# Patient Record
Sex: FEMALE | Race: WHITE | NOT HISPANIC OR LATINO
[De-identification: names, ages, dates, MRNs, and addresses within clinical notes are randomized per-mention and may not be internally consistent; named-entity substitution may affect disease eponyms.]

---

## 2019-07-30 PROBLEM — Z00.00 ENCOUNTER FOR PREVENTIVE HEALTH EXAMINATION: Status: ACTIVE | Noted: 2019-07-30

## 2019-08-01 ENCOUNTER — APPOINTMENT (OUTPATIENT)
Dept: ORTHOPEDIC SURGERY | Facility: CLINIC | Age: 64
End: 2019-08-01
Payer: MEDICARE

## 2019-08-01 DIAGNOSIS — M19.011 PRIMARY OSTEOARTHRITIS, RIGHT SHOULDER: ICD-10-CM

## 2019-08-01 PROCEDURE — 73030 X-RAY EXAM OF SHOULDER: CPT | Mod: RT

## 2019-08-01 PROCEDURE — 99203 OFFICE O/P NEW LOW 30 MIN: CPT

## 2019-08-01 NOTE — ADDENDUM
[FreeTextEntry1] : I, Vianey Vizcaino, acted solely as a scribe for Dr. Dionicio Beavers on this date 08/01/2019.

## 2019-08-01 NOTE — DISCUSSION/SUMMARY
[de-identified] : The underlying pathophysiology was reviewed in great detail with the patient as well as the various treatment options, including ice, analgesics, NSAIDs, Physical therapy, steroid injections and surgery.\par \par She states that her shoulder pain is no longer bearable and would like to proceed with surgery.  I advised a reverse total shoulder replacement due to the high grade tearing of her rotator cuff.\par \par The patient wishes to proceed with SURGICAL INTERVENTION at this time. The risks and benefits of a RIGHT REVERSE TOTAL SHOULDER ARTHROPLASTY were discussed in great detail today, including but not limited to bleeding, infection, nerve injury, DVT, allergy to the anesthetic or to the implants, persistent pain, stiffness, scarring, swelling or deformity.

## 2019-08-01 NOTE — PHYSICAL EXAM
[Normal RUE] : Right Upper Extremity: No scars, rashes, lesions, ulcers, skin intact [Normal LUE] : Left Upper Extremity: No scars, rashes, lesions, ulcers, skin intact [Normal Touch] : sensation intact for touch [Normal] : No swelling, no edema, normal pedal pulses and normal temperature [Poor Appearance] : well-appearing [Acute Distress] : not in acute distress [Obese] : not obese [de-identified] : Right Upper Extremity\par o Shoulder : \par ¦ Inspection/Palpation : no tenderness, no swelling, no deformities\par ¦ Range of Motion : ACTIVE FORWARD ELEVATION: Measured at 120 degrees, ACTIVE EXTERNAL ROTATION: Measured at 30 degrees, ACTIVE INTERNAL ROTATION: Measured at PSIS\par ¦ Strength : external rotation 5/5, internal rotation 5/5, supraspinatus 5/5 \par ¦ Stability : no joint instability on provocative testing\par o Upper Arm : no tenderness, no swelling, no deformities\par o Muscle Bulk : no atrophy\par o Sensation : sensation intact to light touch\par o Skin : no skin rash or discoloration\par o Vascular Exam : no edema, no cyanosis, radial and ulnar pulses normal \par \par Left Upper Extremity\par o Shoulder :\par ¦ Inspection/Palpation : no tenderness, no swelling, no deformity \par ¦ Range of Motion : full and painless range in all planes, no crepitus \par ¦ Strength : external rotation 5/5, internal rotation 5/5, supraspinatus 5/5 \par ¦ Stability : no joint instability on provocative testing\par o Upper Arm : no tenderness, no swelling, no deformities\par o Muscle Bulk : no atrophy \par o Sensation : sensation intact to light touch \par o Skin : no skin rash, no discoloration \par o Vascular Exam : no edema, no cyanosis, radial and ulnar pulses normal  [de-identified] : o An MRI of the right shoulder was obtained at [facility] on 2/11/2019, revealed: (report only)\par 1. Advanced glenohumeral and mild acromioclavicular joint osteoarthritis.\par 2. High-grade, partial-thickness articular surface tearing of the distal supraspinatus and subscapularis tendons involving greater that 75% of the tendon fiber thickness with some articular surface fibers remaining intact. No full thickness rotator cuff tendon tear is seen.\par 3. Moderate infraspinatus tendinopathy. \par \par XRays obtained in the office today, 8/1/2019:\par o  Right Shoulder : Grashey, Axillary, and Outlet views were obtained, there are no soft tissue abnormalities, no fractures, severe glenohumeral osteoarthritis with bone on bone apposition, degenerative change of the greater tuberosity.

## 2019-08-01 NOTE — END OF VISIT
[FreeTextEntry3] : All medical record entries made by the Brayanibjami were at my, Dr. Dionicio Beavers, direction and personally dictated by me on 08/01/2019. I have reviewed the chart and agree that the record accurately reflects my personal performance of the history, physical exam, assessment and plan. I have also personally directed, reviewed, and agreed with the chart.

## 2019-08-01 NOTE — HISTORY OF PRESENT ILLNESS
[de-identified] : 64 year old female presents for an evaluation of right shoulder pain that has been persistent since 2005. An MRI of the right shoulder was obtained on 2/11/2019 which revealed advanced glenohumeral osteoarthritis and a high-grade partial rotator cuff tear; she presents with the report. The patient was treated with a corticosteroid injection of the right shoulder in May 2019, which temporarily alleviated her symptoms. Today she rates her pain an 8/10 and describes it as a aching throbbing pain that is nearly constant in nature.\par Her symptoms are exacerbated with all rotation of her shoulder and with extensive use of her right arm. Her pain has been waking her at night and she is unable to sleep on her right side. Today she would like to discuss the risks and benefits of a right total shoulder arthroplasty.

## 2019-10-28 ENCOUNTER — OUTPATIENT (OUTPATIENT)
Dept: OUTPATIENT SERVICES | Facility: HOSPITAL | Age: 64
LOS: 1 days | End: 2019-10-28
Payer: MEDICARE

## 2019-10-28 VITALS
SYSTOLIC BLOOD PRESSURE: 101 MMHG | HEIGHT: 66 IN | WEIGHT: 171.74 LBS | TEMPERATURE: 98 F | OXYGEN SATURATION: 98 % | DIASTOLIC BLOOD PRESSURE: 69 MMHG | RESPIRATION RATE: 16 BRPM | HEART RATE: 56 BPM

## 2019-10-28 DIAGNOSIS — Z98.891 HISTORY OF UTERINE SCAR FROM PREVIOUS SURGERY: Chronic | ICD-10-CM

## 2019-10-28 DIAGNOSIS — Z98.890 OTHER SPECIFIED POSTPROCEDURAL STATES: Chronic | ICD-10-CM

## 2019-10-28 DIAGNOSIS — Z98.1 ARTHRODESIS STATUS: Chronic | ICD-10-CM

## 2019-10-28 DIAGNOSIS — Z01.818 ENCOUNTER FOR OTHER PREPROCEDURAL EXAMINATION: ICD-10-CM

## 2019-10-28 DIAGNOSIS — M19.011 PRIMARY OSTEOARTHRITIS, RIGHT SHOULDER: ICD-10-CM

## 2019-10-28 DIAGNOSIS — M25.511 PAIN IN RIGHT SHOULDER: ICD-10-CM

## 2019-10-28 LAB
ALBUMIN SERPL ELPH-MCNC: 4 G/DL — SIGNIFICANT CHANGE UP (ref 3.3–5)
ALP SERPL-CCNC: 64 U/L — SIGNIFICANT CHANGE UP (ref 30–120)
ALT FLD-CCNC: 22 U/L DA — SIGNIFICANT CHANGE UP (ref 10–60)
ANION GAP SERPL CALC-SCNC: 6 MMOL/L — SIGNIFICANT CHANGE UP (ref 5–17)
APTT BLD: 29.3 SEC — SIGNIFICANT CHANGE UP (ref 28.5–37)
AST SERPL-CCNC: 13 U/L — SIGNIFICANT CHANGE UP (ref 10–40)
BILIRUB SERPL-MCNC: 0.3 MG/DL — SIGNIFICANT CHANGE UP (ref 0.2–1.2)
BLD GP AB SCN SERPL QL: SIGNIFICANT CHANGE UP
BUN SERPL-MCNC: 11 MG/DL — SIGNIFICANT CHANGE UP (ref 7–23)
CALCIUM SERPL-MCNC: 9.4 MG/DL — SIGNIFICANT CHANGE UP (ref 8.4–10.5)
CHLORIDE SERPL-SCNC: 104 MMOL/L — SIGNIFICANT CHANGE UP (ref 96–108)
CO2 SERPL-SCNC: 29 MMOL/L — SIGNIFICANT CHANGE UP (ref 22–31)
CREAT SERPL-MCNC: 0.68 MG/DL — SIGNIFICANT CHANGE UP (ref 0.5–1.3)
GLUCOSE SERPL-MCNC: 120 MG/DL — HIGH (ref 70–99)
HCT VFR BLD CALC: 43.9 % — SIGNIFICANT CHANGE UP (ref 34.5–45)
HGB BLD-MCNC: 14.4 G/DL — SIGNIFICANT CHANGE UP (ref 11.5–15.5)
INR BLD: 0.99 RATIO — SIGNIFICANT CHANGE UP (ref 0.88–1.16)
MCHC RBC-ENTMCNC: 31.2 PG — SIGNIFICANT CHANGE UP (ref 27–34)
MCHC RBC-ENTMCNC: 32.8 GM/DL — SIGNIFICANT CHANGE UP (ref 32–36)
MCV RBC AUTO: 95.2 FL — SIGNIFICANT CHANGE UP (ref 80–100)
NRBC # BLD: 0 /100 WBCS — SIGNIFICANT CHANGE UP (ref 0–0)
PLATELET # BLD AUTO: 229 K/UL — SIGNIFICANT CHANGE UP (ref 150–400)
POTASSIUM SERPL-MCNC: 4.9 MMOL/L — SIGNIFICANT CHANGE UP (ref 3.5–5.3)
POTASSIUM SERPL-SCNC: 4.9 MMOL/L — SIGNIFICANT CHANGE UP (ref 3.5–5.3)
PROT SERPL-MCNC: 7.3 G/DL — SIGNIFICANT CHANGE UP (ref 6–8.3)
PROTHROM AB SERPL-ACNC: 10.8 SEC — SIGNIFICANT CHANGE UP (ref 10–12.9)
RBC # BLD: 4.61 M/UL — SIGNIFICANT CHANGE UP (ref 3.8–5.2)
RBC # FLD: 13.2 % — SIGNIFICANT CHANGE UP (ref 10.3–14.5)
SODIUM SERPL-SCNC: 139 MMOL/L — SIGNIFICANT CHANGE UP (ref 135–145)
WBC # BLD: 5.91 K/UL — SIGNIFICANT CHANGE UP (ref 3.8–10.5)
WBC # FLD AUTO: 5.91 K/UL — SIGNIFICANT CHANGE UP (ref 3.8–10.5)

## 2019-10-28 PROCEDURE — 93010 ELECTROCARDIOGRAM REPORT: CPT

## 2019-10-28 PROCEDURE — 93005 ELECTROCARDIOGRAM TRACING: CPT

## 2019-10-28 PROCEDURE — G0463: CPT

## 2019-10-28 RX ORDER — L.ACIDOPH/B.ANIMALIS/B.LONGUM 15B CELL
1 CAPSULE ORAL
Qty: 0 | Refills: 0 | DISCHARGE

## 2019-10-28 NOTE — H&P PST ADULT - HISTORY OF PRESENT ILLNESS
63yo right hand dominant female patient with approximately 5yr history of progressively worsening right shoulder pain. She has had one Cortisone injection with slight improvement. She does Yoga several times weekly. She rates the pain at 8/10. She is not taking anything for pain. She was told that shoulder replacement surgery is recommended and presents today for PSTs.

## 2019-10-28 NOTE — H&P PST ADULT - MUSCULOSKELETAL
detailed exam no joint swelling/no joint erythema/no joint warmth/no calf tenderness/right shoulder/decreased ROM due to pain/diminished strength details…

## 2019-10-28 NOTE — H&P PST ADULT - NSICDXPASTMEDICALHX_GEN_ALL_CORE_FT
PAST MEDICAL HISTORY:  Hyperlipidemia no medication    Hypothyroid Natural Iodine with L-Thyrosine    Osteoarthritis PAST MEDICAL HISTORY:  Hyperlipidemia no medication    Hypothyroid Natural Iodine with L-Thyrosine    NAFLD (nonalcoholic fatty liver disease) per patient - resolved since being on a Vegan Diet    Osteoarthritis     Vegan diet

## 2019-10-28 NOTE — H&P PST ADULT - NSICDXFAMILYHX_GEN_ALL_CORE_FT
FAMILY HISTORY:  Family history of lung disease, mother- - mesothelioma  Family history of pacemaker, father- alive- age 87  FH: sleep apnea, brother  FHx: psoriatic arthritis, brother- alive - age 57

## 2019-10-28 NOTE — H&P PST ADULT - NSICDXPROBLEM_GEN_ALL_CORE_FT
PROBLEM DIAGNOSES  Problem: Pain in right shoulder  Assessment and Plan: RIGHT Reverse Total Shoulder Replacement on 11/06/19.

## 2019-10-28 NOTE — H&P PST ADULT - ASSESSMENT
65yo female patient scheduled for surgery on 11/06/19. She will obtain Medical Clearance. She will be NPO as per Anesthesia and will hold supplements for one week pre-op. All pre-op instructions reviewed with patient. She denies any metal allergies. 65yo female patient scheduled for surgery on 11/06/19. She will obtain Medical Clearance. She will be NPO as per Anesthesia and will hold tumeric (which she adds to daily shakes)  for one week pre-op. All pre-op instructions reviewed with patient. She denies any metal allergies.

## 2019-10-28 NOTE — H&P PST ADULT - PRIMARY CARE PROVIDER
Dr. Angel Pham- [phone] (sees practice NP- Sanjuanita Park) Dr. Angel Pham- [phone] (sees practice NP- Meena Park)

## 2019-10-29 LAB
MRSA PCR RESULT.: SIGNIFICANT CHANGE UP
S AUREUS DNA NOSE QL NAA+PROBE: DETECTED

## 2019-10-29 RX ORDER — MUPIROCIN 20 MG/G
1 OINTMENT TOPICAL
Qty: 1 | Refills: 0
Start: 2019-10-29 | End: 2019-11-02

## 2019-10-29 NOTE — PROGRESS NOTE ADULT - SUBJECTIVE AND OBJECTIVE BOX
Nose culture positive for MSSA. mupirocin ointment 2% escribed and sent to patient's pharmacy and to be used twice a day for 5 consecutive days. Called patient to review treatment protocol and questions answered. She verbalized an understanding.

## 2019-11-05 ENCOUNTER — TRANSCRIPTION ENCOUNTER (OUTPATIENT)
Age: 64
End: 2019-11-05

## 2019-11-05 RX ORDER — APREPITANT 80 MG/1
40 CAPSULE ORAL ONCE
Refills: 0 | Status: DISCONTINUED | OUTPATIENT
Start: 2019-11-06 | End: 2019-11-07

## 2019-11-06 ENCOUNTER — APPOINTMENT (OUTPATIENT)
Dept: ORTHOPEDIC SURGERY | Facility: HOSPITAL | Age: 64
End: 2019-11-06

## 2019-11-06 ENCOUNTER — INPATIENT (INPATIENT)
Facility: HOSPITAL | Age: 64
LOS: 0 days | Discharge: HOME HEALTH | DRG: 483 | End: 2019-11-07
Attending: ORTHOPAEDIC SURGERY | Admitting: ORTHOPAEDIC SURGERY
Payer: MEDICARE

## 2019-11-06 ENCOUNTER — RESULT REVIEW (OUTPATIENT)
Age: 64
End: 2019-11-06

## 2019-11-06 VITALS
OXYGEN SATURATION: 98 % | RESPIRATION RATE: 12 BRPM | SYSTOLIC BLOOD PRESSURE: 131 MMHG | HEIGHT: 66 IN | DIASTOLIC BLOOD PRESSURE: 75 MMHG | HEART RATE: 55 BPM | WEIGHT: 164.91 LBS | TEMPERATURE: 98 F

## 2019-11-06 DIAGNOSIS — Z98.890 OTHER SPECIFIED POSTPROCEDURAL STATES: Chronic | ICD-10-CM

## 2019-11-06 DIAGNOSIS — Z98.891 HISTORY OF UTERINE SCAR FROM PREVIOUS SURGERY: Chronic | ICD-10-CM

## 2019-11-06 DIAGNOSIS — Z98.1 ARTHRODESIS STATUS: Chronic | ICD-10-CM

## 2019-11-06 DIAGNOSIS — M19.011 PRIMARY OSTEOARTHRITIS, RIGHT SHOULDER: ICD-10-CM

## 2019-11-06 LAB
ABO RH CONFIRMATION: SIGNIFICANT CHANGE UP
ANION GAP SERPL CALC-SCNC: 12 MMOL/L — SIGNIFICANT CHANGE UP (ref 5–17)
BUN SERPL-MCNC: 8 MG/DL — SIGNIFICANT CHANGE UP (ref 7–23)
CALCIUM SERPL-MCNC: 8.7 MG/DL — SIGNIFICANT CHANGE UP (ref 8.4–10.5)
CHLORIDE SERPL-SCNC: 104 MMOL/L — SIGNIFICANT CHANGE UP (ref 96–108)
CO2 SERPL-SCNC: 23 MMOL/L — SIGNIFICANT CHANGE UP (ref 22–31)
CREAT SERPL-MCNC: 0.86 MG/DL — SIGNIFICANT CHANGE UP (ref 0.5–1.3)
GLUCOSE SERPL-MCNC: 204 MG/DL — HIGH (ref 70–99)
HCT VFR BLD CALC: 40.2 % — SIGNIFICANT CHANGE UP (ref 34.5–45)
HGB BLD-MCNC: 13.5 G/DL — SIGNIFICANT CHANGE UP (ref 11.5–15.5)
POTASSIUM SERPL-MCNC: 4.4 MMOL/L — SIGNIFICANT CHANGE UP (ref 3.5–5.3)
POTASSIUM SERPL-SCNC: 4.4 MMOL/L — SIGNIFICANT CHANGE UP (ref 3.5–5.3)
SODIUM SERPL-SCNC: 139 MMOL/L — SIGNIFICANT CHANGE UP (ref 135–145)

## 2019-11-06 PROCEDURE — 73030 X-RAY EXAM OF SHOULDER: CPT | Mod: 26,RT

## 2019-11-06 PROCEDURE — 99223 1ST HOSP IP/OBS HIGH 75: CPT | Mod: GC

## 2019-11-06 PROCEDURE — 23472 RECONSTRUCT SHOULDER JOINT: CPT | Mod: RT

## 2019-11-06 PROCEDURE — 23472 RECONSTRUCT SHOULDER JOINT: CPT | Mod: AS,RT

## 2019-11-06 PROCEDURE — 88304 TISSUE EXAM BY PATHOLOGIST: CPT | Mod: 26

## 2019-11-06 PROCEDURE — 88311 DECALCIFY TISSUE: CPT | Mod: 26

## 2019-11-06 RX ORDER — CEFAZOLIN SODIUM 1 G
2000 VIAL (EA) INJECTION ONCE
Refills: 0 | Status: COMPLETED | OUTPATIENT
Start: 2019-11-06 | End: 2019-11-06

## 2019-11-06 RX ORDER — ACETAMINOPHEN 500 MG
1000 TABLET ORAL ONCE
Refills: 0 | Status: COMPLETED | OUTPATIENT
Start: 2019-11-06 | End: 2019-11-06

## 2019-11-06 RX ORDER — CEFAZOLIN SODIUM 1 G
2000 VIAL (EA) INJECTION EVERY 8 HOURS
Refills: 0 | Status: COMPLETED | OUTPATIENT
Start: 2019-11-06 | End: 2019-11-06

## 2019-11-06 RX ORDER — HYDROMORPHONE HYDROCHLORIDE 2 MG/ML
0.5 INJECTION INTRAMUSCULAR; INTRAVENOUS; SUBCUTANEOUS
Refills: 0 | Status: DISCONTINUED | OUTPATIENT
Start: 2019-11-06 | End: 2019-11-07

## 2019-11-06 RX ORDER — MAGNESIUM HYDROXIDE 400 MG/1
30 TABLET, CHEWABLE ORAL DAILY
Refills: 0 | Status: DISCONTINUED | OUTPATIENT
Start: 2019-11-06 | End: 2019-11-07

## 2019-11-06 RX ORDER — OXYCODONE HYDROCHLORIDE 5 MG/1
10 TABLET ORAL
Refills: 0 | Status: DISCONTINUED | OUTPATIENT
Start: 2019-11-06 | End: 2019-11-07

## 2019-11-06 RX ORDER — ACETAMINOPHEN 500 MG
1000 TABLET ORAL EVERY 8 HOURS
Refills: 0 | Status: DISCONTINUED | OUTPATIENT
Start: 2019-11-07 | End: 2019-11-07

## 2019-11-06 RX ORDER — ACETAMINOPHEN 500 MG
1000 TABLET ORAL EVERY 6 HOURS
Refills: 0 | Status: COMPLETED | OUTPATIENT
Start: 2019-11-06 | End: 2019-11-07

## 2019-11-06 RX ORDER — SODIUM CHLORIDE 9 MG/ML
1000 INJECTION, SOLUTION INTRAVENOUS
Refills: 0 | Status: DISCONTINUED | OUTPATIENT
Start: 2019-11-06 | End: 2019-11-06

## 2019-11-06 RX ORDER — OXYCODONE HYDROCHLORIDE 5 MG/1
5 TABLET ORAL
Refills: 0 | Status: DISCONTINUED | OUTPATIENT
Start: 2019-11-06 | End: 2019-11-07

## 2019-11-06 RX ORDER — CHLORHEXIDINE GLUCONATE 213 G/1000ML
1 SOLUTION TOPICAL DAILY
Refills: 0 | Status: DISCONTINUED | OUTPATIENT
Start: 2019-11-06 | End: 2019-11-06

## 2019-11-06 RX ORDER — ONDANSETRON 8 MG/1
4 TABLET, FILM COATED ORAL EVERY 6 HOURS
Refills: 0 | Status: DISCONTINUED | OUTPATIENT
Start: 2019-11-06 | End: 2019-11-07

## 2019-11-06 RX ORDER — TRANEXAMIC ACID 100 MG/ML
1000 INJECTION, SOLUTION INTRAVENOUS ONCE
Refills: 0 | Status: COMPLETED | OUTPATIENT
Start: 2019-11-06 | End: 2019-11-06

## 2019-11-06 RX ORDER — HYDROMORPHONE HYDROCHLORIDE 2 MG/ML
0.5 INJECTION INTRAMUSCULAR; INTRAVENOUS; SUBCUTANEOUS
Refills: 0 | Status: DISCONTINUED | OUTPATIENT
Start: 2019-11-06 | End: 2019-11-06

## 2019-11-06 RX ORDER — SODIUM CHLORIDE 9 MG/ML
1000 INJECTION, SOLUTION INTRAVENOUS
Refills: 0 | Status: DISCONTINUED | OUTPATIENT
Start: 2019-11-06 | End: 2019-11-07

## 2019-11-06 RX ORDER — CELECOXIB 200 MG/1
200 CAPSULE ORAL EVERY 12 HOURS
Refills: 0 | Status: DISCONTINUED | OUTPATIENT
Start: 2019-11-06 | End: 2019-11-07

## 2019-11-06 RX ORDER — ONDANSETRON 8 MG/1
4 TABLET, FILM COATED ORAL ONCE
Refills: 0 | Status: DISCONTINUED | OUTPATIENT
Start: 2019-11-06 | End: 2019-11-06

## 2019-11-06 RX ORDER — ASPIRIN/CALCIUM CARB/MAGNESIUM 324 MG
81 TABLET ORAL
Refills: 0 | Status: DISCONTINUED | OUTPATIENT
Start: 2019-11-07 | End: 2019-11-07

## 2019-11-06 RX ORDER — ASCORBIC ACID 60 MG
1 TABLET,CHEWABLE ORAL
Qty: 0 | Refills: 0 | DISCHARGE

## 2019-11-06 RX ORDER — PANTOPRAZOLE SODIUM 20 MG/1
40 TABLET, DELAYED RELEASE ORAL
Refills: 0 | Status: DISCONTINUED | OUTPATIENT
Start: 2019-11-06 | End: 2019-11-07

## 2019-11-06 RX ADMIN — CHLORHEXIDINE GLUCONATE 1 APPLICATION(S): 213 SOLUTION TOPICAL at 07:33

## 2019-11-06 RX ADMIN — SODIUM CHLORIDE 100 MILLILITER(S): 9 INJECTION, SOLUTION INTRAVENOUS at 15:34

## 2019-11-06 RX ADMIN — Medication 1000 MILLIGRAM(S): at 21:21

## 2019-11-06 RX ADMIN — Medication 1000 MILLIGRAM(S): at 15:57

## 2019-11-06 RX ADMIN — SODIUM CHLORIDE 100 MILLILITER(S): 9 INJECTION, SOLUTION INTRAVENOUS at 11:45

## 2019-11-06 RX ADMIN — Medication 400 MILLIGRAM(S): at 21:20

## 2019-11-06 RX ADMIN — Medication 100 MILLIGRAM(S): at 15:57

## 2019-11-06 RX ADMIN — Medication 400 MILLIGRAM(S): at 15:30

## 2019-11-06 RX ADMIN — Medication 100 MILLIGRAM(S): at 23:30

## 2019-11-06 NOTE — CONSULT NOTE ADULT - ASSESSMENT
63 yo f pmh osteoarthritis, NAFLD, hypothyroid, HLD is s/p Right shoulder replacement POD #0.  1. s/p Right shoulder replacement POD #0  - post op orders and managemet per ortho team  - Bowl regimen  - Pain management  - Physical therapy eval  - Obtain baseline labs  - Dispo planning    2. Hypothyroid - takes special supplement.     3. HLD - diet controlled    4. NAFLD - chronic, diet controlled    5. DVT prophy - per ortho team. 65 yo f pmh osteoarthritis, NAFLD, hypothyroid, HLD is s/p Right shoulder replacement POD #0.  1. s/p Right shoulder replacement POD #0  - post op orders and managemet per ortho team  - Bowl regimen  - Pain management  - Physical therapy eval  - Obtain baseline labs  - Dispo planning    2. Hypothyroid - takes special supplement iodine at home.  Nothing here.      3. HLD - diet controlled    4. NAFLD - chronic, diet controlled    5. DVT prophy - per ortho team.

## 2019-11-06 NOTE — CONSULT NOTE ADULT - SUBJECTIVE AND OBJECTIVE BOX
Patient is a 64y old  Female who presents with a chief complaint of     HPI:    63 yo f pmh osteoarthritis, NAFLD, hypothyroid, HLD is s/p Right shoulder replacement POD #0.  She has approximately 5yr history of progressively worsening right shoulder pain.   In the past, she has had one Cortisone injection with slight improvement.   She does Yoga several times weekly. She rated the pain at 8/10.   She did not taking anything for pain.   She was told that shoulder replacement surgery is recommended.    Currently pt is feeling.         REVIEW OF SYSTEMS:    CONSTITUTIONAL: No fever, weight loss, or fatigue  EYES: No eye pain, visual disturbances, or discharge  ENMT:  No difficulty hearing, tinnitus, vertigo; No sinus or throat pain  NECK: No pain or stiffness  RESPIRATORY: No cough, wheezing, chills or hemoptysis; No shortness of breath  CARDIOVASCULAR: No chest pain, palpitations, dizziness, or leg swelling  GASTROINTESTINAL: No abdominal or epigastric pain. No nausea, vomiting, or hematemesis; No diarrhea or constipation. No melena or hematochezia.  GENITOURINARY: No dysuria, frequency, hematuria, or incontinence  NEUROLOGICAL: No headaches, memory loss, loss of strength, numbness, or tremors  SKIN: No itching, burning, rashes, or lesions   LYMPH NODES: No enlarged glands  ENDOCRINE: No heat or cold intolerance; No hair loss  MUSCULOSKELETAL: No joint pain or swelling; No muscle, back, or extremity pain  PSYCHIATRIC: No depression, anxiety, mood swings, or difficulty sleeping      PAST MEDICAL & SURGICAL HISTORY:  Vegan diet  NAFLD (nonalcoholic fatty liver disease): per patient - resolved since being on a Vegan Diet  Hypothyroid: Natural Iodine with L-Thyrosine  Osteoarthritis  Hyperlipidemia: no medication  S/P bilateral breast reduction:   S/p bilateral blepharoplasty:   S/P  section:   S/P abdominoplasty:   S/P cervical spinal fusion:   S/P arthroscopy of right shoulder:       FAMILY HISTORY:  FH: sleep apnea: brother  FHx: psoriatic arthritis: brother- alive - age 57  Family history of lung disease: mother- - mesothelioma  Family history of pacemaker: father- alive- age 87      Social History    denies tobacco, etoh, or drugs    MEDICATIONS  (STANDING):  acetaminophen  IVPB .. 1000 milliGRAM(s) IV Intermittent every 6 hours  lactated ringers. 1000 milliLiter(s) (100 mL/Hr) IV Continuous <Continuous>    MEDICATIONS  (PRN):  HYDROmorphone  Injectable 0.5 milliGRAM(s) IV Push every 10 minutes PRN Moderate Pain (4 - 6)  ondansetron Injectable 4 milliGRAM(s) IV Push once PRN Nausea and/or Vomiting      Allergies    No Known Allergies    Intolerances        Vital Signs Last 24 Hrs  T(C): 36.4 (2019 11:08), Max: 36.5 (2019 06:35)  T(F): 97.6 (2019 11:08), Max: 97.7 (2019 06:35)  HR: 80 (2019 11:45) (55 - 99)  BP: 123/56 (2019 11:45) (117/65 - 146/76)  BP(mean): --  RR: 15 (2019 11:45) (12 - 18)  SpO2: 96% (2019 11:45) (96% - 98%)  PHYSICAL EXAM:      GENERAL: NAD, well-groomed, well-developed  HEAD:  Atraumatic, Normocephalic  EYES: EOMI, PERRLA, conjunctiva and sclera clear  ENMT: No tonsillar erythema, exudates, or enlargement; Moist mucous membranes, Good dentition, No lesions  NECK: Supple, No JVD, Normal thyroid  CHEST/LUNG: Clear to percussion bilaterally; No rales, rhonchi, wheezing, or rubs  HEART: Regular rate and rhythm; No murmurs, rubs, or gallops  ABDOMEN: Soft, Nontender, Nondistended; Bowel sounds present  EXTREMITIES:  2+ Peripheral Pulses, No clubbing, cyanosis, or edema  LYMPH: No lymphadenopathy noted  NERVOUS SYSTEM:  Alert & Oriented X3, Good concentration;   SKIN: No rashes or lesions                        CAPILLARY BLOOD GLUCOSE          EKG -     RADIOLOGY STUDIES Patient is a 64y old  Female who presents with a chief complaint of     HPI:    63 yo f pmh osteoarthritis, NAFLD, hypothyroid, HLD is s/p Right shoulder replacement POD #0.  She has approximately 5yr history of progressively worsening right shoulder pain.   In the past, she has had one Cortisone injection with slight improvement.   She does Yoga several times weekly. She rated the pain at 8/10.   She did not taking anything for pain.   She was told that shoulder replacement surgery is recommended.    Currently pt is feeling fine.       REVIEW OF SYSTEMS:    CONSTITUTIONAL: No fever, weight loss, or fatigue  EYES: No eye pain, visual disturbances, or discharge  ENMT:  No difficulty hearing, tinnitus, vertigo; No sinus or throat pain  NECK: No pain or stiffness  RESPIRATORY: No cough, wheezing, chills or hemoptysis; No shortness of breath  CARDIOVASCULAR: No chest pain, palpitations, dizziness, or leg swelling  GASTROINTESTINAL: No abdominal or epigastric pain. No nausea, vomiting, or hematemesis; No diarrhea or constipation. No melena or hematochezia.  GENITOURINARY: No dysuria, frequency, hematuria, or incontinence  NEUROLOGICAL: No headaches, memory loss, loss of strength, numbness, or tremors  SKIN: No itching, burning, rashes, or lesions   LYMPH NODES: No enlarged glands  ENDOCRINE: No heat or cold intolerance; No hair loss  MUSCULOSKELETAL: No joint pain or swelling; No muscle, back, or extremity pain  PSYCHIATRIC: No depression, anxiety, mood swings, or difficulty sleeping      PAST MEDICAL & SURGICAL HISTORY:  Vegan diet  NAFLD (nonalcoholic fatty liver disease): per patient - resolved since being on a Vegan Diet  Hypothyroid: Natural Iodine with L-Thyrosine  Osteoarthritis  Hyperlipidemia: no medication  S/P bilateral breast reduction:   S/p bilateral blepharoplasty:   S/P  section:   S/P abdominoplasty:   S/P cervical spinal fusion:   S/P arthroscopy of right shoulder:       FAMILY HISTORY:  FH: sleep apnea: brother  FHx: psoriatic arthritis: brother- alive - age 57  Family history of lung disease: mother- - mesothelioma  Family history of pacemaker: father- alive- age 87      Social History    denies tobacco, etoh, or drugs    MEDICATIONS  (STANDING):  acetaminophen  IVPB .. 1000 milliGRAM(s) IV Intermittent every 6 hours  lactated ringers. 1000 milliLiter(s) (100 mL/Hr) IV Continuous <Continuous>    MEDICATIONS  (PRN):  HYDROmorphone  Injectable 0.5 milliGRAM(s) IV Push every 10 minutes PRN Moderate Pain (4 - 6)  ondansetron Injectable 4 milliGRAM(s) IV Push once PRN Nausea and/or Vomiting      Allergies    No Known Allergies    Intolerances        Vital Signs Last 24 Hrs  T(C): 36.4 (2019 11:08), Max: 36.5 (2019 06:35)  T(F): 97.6 (2019 11:08), Max: 97.7 (2019 06:35)  HR: 80 (2019 11:45) (55 - 99)  BP: 123/56 (2019 11:45) (117/65 - 146/76)  BP(mean): --  RR: 15 (2019 11:45) (12 - 18)  SpO2: 96% (2019 11:45) (96% - 98%)  PHYSICAL EXAM:      GENERAL: NAD, well-groomed, well-developed  HEAD:  Atraumatic, Normocephalic  EYES: EOMI, PERRLA, conjunctiva and sclera clear  ENMT: No tonsillar erythema, exudates, or enlargement; Moist mucous membranes, Good dentition, No lesions  NECK: Supple, No JVD, Normal thyroid  CHEST/LUNG: Clear to percussion bilaterally; No rales, rhonchi, wheezing, or rubs  HEART: Regular rate and rhythm; No murmurs, rubs, or gallops  ABDOMEN: Soft, Nontender, Nondistended; Bowel sounds present  EXTREMITIES:  Right shoulder brace.  Able to move fingers. decreased sensation in fingers on right hand.  2+ Peripheral Pulses, No clubbing, cyanosis, or edema  LYMPH: No lymphadenopathy noted  NERVOUS SYSTEM:  Alert & Oriented X3, Good concentration; decreased sensation in fingers on right hand  SKIN: No rashes or lesions                        CAPILLARY BLOOD GLUCOSE          EKG -     RADIOLOGY STUDIES

## 2019-11-06 NOTE — BRIEF OPERATIVE NOTE - NSICDXBRIEFPROCEDURE_GEN_ALL_CORE_FT
PROCEDURES:  Open replacement of right shoulder joint with reverse ball and socket synthetic substitute 06-Nov-2019 11:24:34  Smita Nava

## 2019-11-07 ENCOUNTER — TRANSCRIPTION ENCOUNTER (OUTPATIENT)
Age: 64
End: 2019-11-07

## 2019-11-07 VITALS
RESPIRATION RATE: 16 BRPM | TEMPERATURE: 97 F | DIASTOLIC BLOOD PRESSURE: 65 MMHG | OXYGEN SATURATION: 95 % | SYSTOLIC BLOOD PRESSURE: 139 MMHG | HEART RATE: 54 BPM

## 2019-11-07 LAB
ANION GAP SERPL CALC-SCNC: 10 MMOL/L — SIGNIFICANT CHANGE UP (ref 5–17)
BASOPHILS # BLD AUTO: 0.01 K/UL — SIGNIFICANT CHANGE UP (ref 0–0.2)
BASOPHILS NFR BLD AUTO: 0.1 % — SIGNIFICANT CHANGE UP (ref 0–2)
BUN SERPL-MCNC: 9 MG/DL — SIGNIFICANT CHANGE UP (ref 7–23)
CALCIUM SERPL-MCNC: 8.8 MG/DL — SIGNIFICANT CHANGE UP (ref 8.4–10.5)
CHLORIDE SERPL-SCNC: 107 MMOL/L — SIGNIFICANT CHANGE UP (ref 96–108)
CO2 SERPL-SCNC: 24 MMOL/L — SIGNIFICANT CHANGE UP (ref 22–31)
CREAT SERPL-MCNC: 0.69 MG/DL — SIGNIFICANT CHANGE UP (ref 0.5–1.3)
EOSINOPHIL # BLD AUTO: 0.02 K/UL — SIGNIFICANT CHANGE UP (ref 0–0.5)
EOSINOPHIL NFR BLD AUTO: 0.2 % — SIGNIFICANT CHANGE UP (ref 0–6)
GLUCOSE SERPL-MCNC: 130 MG/DL — HIGH (ref 70–99)
HCT VFR BLD CALC: 38.7 % — SIGNIFICANT CHANGE UP (ref 34.5–45)
HGB BLD-MCNC: 12.6 G/DL — SIGNIFICANT CHANGE UP (ref 11.5–15.5)
IMM GRANULOCYTES NFR BLD AUTO: 0.4 % — SIGNIFICANT CHANGE UP (ref 0–1.5)
LYMPHOCYTES # BLD AUTO: 1.52 K/UL — SIGNIFICANT CHANGE UP (ref 1–3.3)
LYMPHOCYTES # BLD AUTO: 13 % — SIGNIFICANT CHANGE UP (ref 13–44)
MCHC RBC-ENTMCNC: 31.3 PG — SIGNIFICANT CHANGE UP (ref 27–34)
MCHC RBC-ENTMCNC: 32.6 GM/DL — SIGNIFICANT CHANGE UP (ref 32–36)
MCV RBC AUTO: 96.3 FL — SIGNIFICANT CHANGE UP (ref 80–100)
MONOCYTES # BLD AUTO: 0.87 K/UL — SIGNIFICANT CHANGE UP (ref 0–0.9)
MONOCYTES NFR BLD AUTO: 7.5 % — SIGNIFICANT CHANGE UP (ref 2–14)
NEUTROPHILS # BLD AUTO: 9.18 K/UL — HIGH (ref 1.8–7.4)
NEUTROPHILS NFR BLD AUTO: 78.8 % — HIGH (ref 43–77)
NRBC # BLD: 0 /100 WBCS — SIGNIFICANT CHANGE UP (ref 0–0)
PLATELET # BLD AUTO: 222 K/UL — SIGNIFICANT CHANGE UP (ref 150–400)
POTASSIUM SERPL-MCNC: 4.2 MMOL/L — SIGNIFICANT CHANGE UP (ref 3.5–5.3)
POTASSIUM SERPL-SCNC: 4.2 MMOL/L — SIGNIFICANT CHANGE UP (ref 3.5–5.3)
RBC # BLD: 4.02 M/UL — SIGNIFICANT CHANGE UP (ref 3.8–5.2)
RBC # FLD: 13.8 % — SIGNIFICANT CHANGE UP (ref 10.3–14.5)
SODIUM SERPL-SCNC: 141 MMOL/L — SIGNIFICANT CHANGE UP (ref 135–145)
WBC # BLD: 11.65 K/UL — HIGH (ref 3.8–10.5)
WBC # FLD AUTO: 11.65 K/UL — HIGH (ref 3.8–10.5)

## 2019-11-07 PROCEDURE — 99233 SBSQ HOSP IP/OBS HIGH 50: CPT

## 2019-11-07 RX ORDER — OXYCODONE HYDROCHLORIDE 5 MG/1
1 TABLET ORAL
Qty: 42 | Refills: 0
Start: 2019-11-07 | End: 2019-11-13

## 2019-11-07 RX ORDER — CELECOXIB 200 MG/1
1 CAPSULE ORAL
Qty: 60 | Refills: 0
Start: 2019-11-07 | End: 2019-12-06

## 2019-11-07 RX ORDER — ACETAMINOPHEN 500 MG
2 TABLET ORAL
Qty: 0 | Refills: 0 | DISCHARGE
Start: 2019-11-07

## 2019-11-07 RX ORDER — ASPIRIN/CALCIUM CARB/MAGNESIUM 324 MG
1 TABLET ORAL
Qty: 84 | Refills: 0
Start: 2019-11-07 | End: 2019-12-18

## 2019-11-07 RX ORDER — PANTOPRAZOLE SODIUM 20 MG/1
1 TABLET, DELAYED RELEASE ORAL
Qty: 30 | Refills: 1
Start: 2019-11-07 | End: 2020-01-05

## 2019-11-07 RX ORDER — L.ACIDOPH/B.ANIMALIS/B.LONGUM 15B CELL
2 CAPSULE ORAL
Qty: 0 | Refills: 0 | DISCHARGE

## 2019-11-07 RX ADMIN — Medication 1000 MILLIGRAM(S): at 10:14

## 2019-11-07 RX ADMIN — CELECOXIB 200 MILLIGRAM(S): 200 CAPSULE ORAL at 05:20

## 2019-11-07 RX ADMIN — OXYCODONE HYDROCHLORIDE 5 MILLIGRAM(S): 5 TABLET ORAL at 09:06

## 2019-11-07 RX ADMIN — Medication 81 MILLIGRAM(S): at 05:20

## 2019-11-07 RX ADMIN — CELECOXIB 200 MILLIGRAM(S): 200 CAPSULE ORAL at 05:19

## 2019-11-07 RX ADMIN — Medication 400 MILLIGRAM(S): at 03:03

## 2019-11-07 RX ADMIN — OXYCODONE HYDROCHLORIDE 5 MILLIGRAM(S): 5 TABLET ORAL at 16:13

## 2019-11-07 RX ADMIN — OXYCODONE HYDROCHLORIDE 5 MILLIGRAM(S): 5 TABLET ORAL at 16:43

## 2019-11-07 RX ADMIN — Medication 1000 MILLIGRAM(S): at 03:04

## 2019-11-07 RX ADMIN — PANTOPRAZOLE SODIUM 40 MILLIGRAM(S): 20 TABLET, DELAYED RELEASE ORAL at 05:19

## 2019-11-07 RX ADMIN — OXYCODONE HYDROCHLORIDE 5 MILLIGRAM(S): 5 TABLET ORAL at 09:36

## 2019-11-07 NOTE — DISCHARGE NOTE PROVIDER - NSDCACTIVITY_GEN_ALL_CORE
Do not drive or operate machinery/Walking - Indoors allowed/No heavy lifting/straining/Walking - Outdoors allowed/Stairs allowed/Showering allowed

## 2019-11-07 NOTE — DISCHARGE NOTE PROVIDER - NSDCCPTREATMENT_GEN_ALL_CORE_FT
PRINCIPAL PROCEDURE  Procedure: Open replacement of right shoulder joint with reverse ball and socket synthetic substitute  Findings and Treatment:

## 2019-11-07 NOTE — DISCHARGE NOTE PROVIDER - NSDCMRMEDTOKEN_GEN_ALL_CORE_FT
acetaminophen 500 mg oral tablet: 2 tab(s) orally every 8 hours  aspirin 81 mg oral delayed release tablet: 1 tab(s) orally 2 times a day. Take at least 2 hours before celebrex  celecoxib 200 mg oral capsule: 1 cap(s) orally every 12 hours. Take at least 2 hours after aspirin  Multiple Vitamins with Minerals oral tablet: 1 tab(s) orally once a day  mupirocin 2% topical ointment: 1 application in each nostril 2 times a day   pantoprazole 40 mg oral delayed release tablet: 1 tab(s) orally once a day (before a meal)  Probiotic Formula oral capsule: 2 cap(s) orally once a day  Sleep supplement: 1 dose(s) orally once a day (at bedtime)  super colon cleanse: 1 dose(s) orally once a day  thyroid care: 2 cap(s) orally once a day  Vitamin C 500 mg oral tablet: 1 tab(s) orally once a day acetaminophen 500 mg oral tablet: 2 tab(s) orally every 8 hours  aspirin 81 mg oral delayed release tablet: 1 tab(s) orally 2 times a day. Take at least 2 hours before celebrex  celecoxib 200 mg oral capsule: 1 cap(s) orally every 12 hours. Take at least 2 hours after aspirin  Multiple Vitamins with Minerals oral tablet: 1 tab(s) orally once a day  mupirocin 2% topical ointment: 1 application in each nostril 2 times a day   oxyCODONE 5 mg oral tablet: 1 tab(s) orally every 4 hours, As Needed - Pain  MDD:6  Reference #: [id number]  pantoprazole 40 mg oral delayed release tablet: 1 tab(s) orally once a day (before a meal)  Probiotic Formula oral capsule: 2 cap(s) orally once a day  Sleep supplement: 1 dose(s) orally once a day (at bedtime)  super colon cleanse: 1 dose(s) orally once a day  thyroid care: 2 cap(s) orally once a day  Vitamin C 500 mg oral tablet: 1 tab(s) orally once a day acetaminophen 500 mg oral tablet: 2 tab(s) orally every 8 hours  aspirin 81 mg oral delayed release tablet: 1 tab(s) orally 2 times a day. Take at least 2 hours before celebrex  celecoxib 200 mg oral capsule: 1 cap(s) orally every 12 hours. Take at least 2 hours after aspirin  Multiple Vitamins with Minerals oral tablet: 1 tab(s) orally once a day  oxyCODONE 5 mg oral tablet: 1 tab(s) orally every 4 hours, As Needed - Pain  MDD:6  Reference #: [id number]  pantoprazole 40 mg oral delayed release tablet: 1 tab(s) orally once a day (before a meal)  Vitamin C 500 mg oral tablet: 1 tab(s) orally once a day acetaminophen 500 mg oral tablet: 2 tab(s) orally every 8 hours  aspirin 81 mg oral delayed release tablet: 1 tab(s) orally 2 times a day. Take at least 2 hours before celebrex  celecoxib 200 mg oral capsule: 1 cap(s) orally every 12 hours. Take at least 2 hours after aspirin  Multiple Vitamins with Minerals oral tablet: 1 tab(s) orally once a day  oxyCODONE 5 mg oral tablet: 1 tab(s) orally every 4 hours, As Needed - Pain  MDD:6  Reference #: [id number]  pantoprazole 40 mg oral delayed release tablet: 1 tab(s) orally once a day (before a meal)  Vitamin C 500 mg oral tablet: 1 tab(s) orally once a day

## 2019-11-07 NOTE — DISCHARGE NOTE NURSING/CASE MANAGEMENT/SOCIAL WORK - PATIENT PORTAL LINK FT
You can access the FollowMyHealth Patient Portal offered by [facility] by registering at the following website: [email or web address]. By joining [employer]’s FollowMyHealth portal, you will also be able to view your health information using other applications (apps) compatible with our system.

## 2019-11-07 NOTE — DISCHARGE NOTE PROVIDER - HOSPITAL COURSE
YESSICA WAKEFIELD        Admitted on 11-06-19         64y y.o.  Female with history of Arthritis of right shoulder region        Surgery:   Open replacement of right shoulder joint with reverse ball and socket synthetic substitute        Orthopedic Surgeon:   Dr. VALERIA Beavers        Mary-operative antibiotic:      ceFAZolin   IVPB:,             Consulted Services : Hospitalist, Physical Therapy, Occupational Therapy        Typical Physical & occupational therapy modalities post Open replacement of right shoulder joint with reverse ball and socket synthetic substitute     were performed including ambulation training, range of motion, ADL's, and transfers.         DVT prophylaxis:  aspirin enteric coated: 81 milliGRAM(s)             The patient had a clean appearing surgical incision with no sign of surgical site infections and had a stable neuro / vascular exam of the operated extremity.  After progression of mobility guided by the PT/ OT staff,  the patient was felt to benefit from further rehabilitative care for restoration to level of function. This was felt to best be accomplished at Home.        Discharge and Orthopedic Care instructions were delineated in the Discharge Plan and reviewed with the patient. All medications were delineated in the medication reconciliation tool and key points were reviewed with the patient.         This patient was deemed stable from an Orthopedic & medical standpoint for discharge today.    She will follow up with Dr. VALERIA Beavers for further Orthopedic care.

## 2019-11-07 NOTE — DISCHARGE NOTE PROVIDER - NSDCFUADDINST_GEN_ALL_CORE_FT
Do not bear weight on operative extremity.   Sling for comfort and ambulation only  Remove sling to exercise elbow and shoulder  Full AAROM of shoulder  No external rotation past 30 degrees  No resisted internal rotation  May do Codman's and pendulums  Keep the incision clean and dry  Change the dressing daily if there is drainage noted  When there is no drainage the incision may be left open to air  You may shower and pat dry with a clean towel when there is no drainage  Suture or Prineo removal 2 weeks after surgery at your surgeon's office Do not bear weight on operative extremity.   Sling for comfort and ambulation only  Remove sling to exercise elbow and shoulder  Full AAROM of shoulder  No external rotation past 30 degrees  No resisted internal rotation  May do Codman's and pendulums  Keep the incision clean and dry  Change the dressing daily if there is drainage noted  When there is no drainage the incision may be left open to air  You may shower and pat dry with a clean towel when there is no drainage  Suture or Prineo removal 2 weeks after surgery at your surgeon's office   Call your doctor if you experience:  • An increase in pain not controlled by pain medication or change in activity or  position.  • Temperature greater than 101° F.  • Redness, increased swelling or foul smelling drainage from or around the  incision.  • Numbness, tingling or a change in color or temperature of the operative leg.  • Call your doctor immediately if you experience chest pain, shortness of breath or calf pain.    For Constipation :   • Increase your water intake. Drink at least 8 glasses of water daily.  • Try adding fiber to your diet by eating fruits, vegetables and foods that are rich in grains.  • If you do experience constipation, you may take an over-the-counter stool softener/laxative such as Colace, Senokot or Milk of Magnesia.

## 2019-11-07 NOTE — PROGRESS NOTE ADULT - SUBJECTIVE AND OBJECTIVE BOX
Post Op Day # 1    SUBJECTIVE    65yo Female status post Right Rev TSR .   Patient is alert and comfortable.    Pain is controlled with current pain regimen.  Denies nausea, vomiting, chest pain, shortness of breath, abdominal pain or fever.   No new complaints.    OBJECTIVE    Vital Signs Last 24 Hrs  T(C): 36.8 (07 Nov 2019 07:58), Max: 36.8 (07 Nov 2019 07:58)  T(F): 98.3 (07 Nov 2019 07:58), Max: 98.3 (07 Nov 2019 07:58)  HR: 58 (07 Nov 2019 07:58) (58 - 99)  BP: 144/80 (07 Nov 2019 07:58) (104/65 - 146/76)  BP(mean): --  RR: 16 (07 Nov 2019 07:58) (13 - 19)  SpO2: 97% (07 Nov 2019 07:58) (94% - 98%)  I&O's Summary    06 Nov 2019 07:01  -  07 Nov 2019 07:00  --------------------------------------------------------  IN: 3325 mL / OUT: 300 mL / NET: 3025 mL        PHYSICAL EXAM    Right shoulder dressing is clean, dry and intact.   Sensation to light touch is grossly intact distally.    Motor function distally is intact.   (2+) radial pulse. Capillary refill is less than 3 seconds. No cyanosis.  Calves soft/nontender bilaterally                          12.6   11.65<H> )-----------( 222      ( 07 Nov 2019 07:12 )             38.7   07 Nov 2019 07:12                        13.5   x     )-----------( x        ( 06 Nov 2019 16:38 )             40.2   06 Nov 2019 16:38    07 Nov 2019 07:12    141    |  107    |  9      ----------------------------<  130<H>  4.2     |  24     |  0.69   06 Nov 2019 16:38    139    |  104    |  8      ----------------------------<  204<H>  4.4     |  23     |  0.86     Ca    8.8        07 Nov 2019 07:12  Ca    8.7        06 Nov 2019 16:38        ASSESSMENT AND PLAN    - Orthopedically stable  - DVT prophylaxis: PAS + Ecotrin 81mg twice daily  - Continue physical therapy and occupational therapy  - None weight bearing- right upper extremity  - Incentive spirometry encouraged  - Pain control as clinically indicated  - Disposition:  Home when cleared by medicine/PT/OT Post Op Day # 1    SUBJECTIVE    65yo Female status post Right Rev TSR .   Patient is alert and comfortable.    Pain is controlled with current pain regimen.  Denies nausea, vomiting, chest pain, shortness of breath, abdominal pain or fever.   No new complaints.    OBJECTIVE    Vital Signs Last 24 Hrs  T(C): 36.8 (07 Nov 2019 07:58), Max: 36.8 (07 Nov 2019 07:58)  T(F): 98.3 (07 Nov 2019 07:58), Max: 98.3 (07 Nov 2019 07:58)  HR: 58 (07 Nov 2019 07:58) (58 - 99)  BP: 144/80 (07 Nov 2019 07:58) (104/65 - 146/76)  BP(mean): --  RR: 16 (07 Nov 2019 07:58) (13 - 19)  SpO2: 97% (07 Nov 2019 07:58) (94% - 98%)  I&O's Summary    06 Nov 2019 07:01  -  07 Nov 2019 07:00  --------------------------------------------------------  IN: 3325 mL / OUT: 300 mL / NET: 3025 mL        PHYSICAL EXAM    Right shoulder dressing is clean, dry and intact.    Motor function distally is intact.   (2+) radial pulse. Capillary refill is less than 3 seconds. No cyanosis.  Calves soft/nontender bilaterally                          12.6   11.65<H> )-----------( 222      ( 07 Nov 2019 07:12 )             38.7   07 Nov 2019 07:12                        13.5   x     )-----------( x        ( 06 Nov 2019 16:38 )             40.2   06 Nov 2019 16:38    07 Nov 2019 07:12    141    |  107    |  9      ----------------------------<  130<H>  4.2     |  24     |  0.69   06 Nov 2019 16:38    139    |  104    |  8      ----------------------------<  204<H>  4.4     |  23     |  0.86     Ca    8.8        07 Nov 2019 07:12  Ca    8.7        06 Nov 2019 16:38        ASSESSMENT AND PLAN    - Orthopedically stable  - DVT prophylaxis: PAS + Ecotrin 81mg twice daily  - Continue physical therapy and occupational therapy  - None weight bearing- right upper extremity  - Incentive spirometry encouraged  - Pain control as clinically indicated  - Disposition:  Home when cleared by medicine/PT/OT
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
## 2019-11-07 NOTE — DISCHARGE NOTE PROVIDER - NSDCCPCAREPLAN_GEN_ALL_CORE_FT
PRINCIPAL DISCHARGE DIAGNOSIS  Diagnosis: Osteoarthritis of right shoulder  Assessment and Plan of Treatment:

## 2019-11-07 NOTE — PROGRESS NOTE ADULT - ASSESSMENT
63 yo f pmh osteoarthritis, NAFLD, hypothyroid, HLD is s/p Right shoulder replacement POD #1.  1. s/p Right shoulder replacement POD #1  - post op orders and managemet per ortho team  - Bowl regimen  - Pain management  - Physical therapy eval  - Dispo planning    2. Hypothyroid - takes special supplement.     3. HLD - diet controlled    4. NAFLD - chronic, diet controlled    5. DVT prophy - per ortho team. 63 yo f pmh osteoarthritis, NAFLD, hypothyroid, HLD is s/p Right shoulder replacement POD #1.  1. s/p Right shoulder replacement POD #1  - post op orders and managemet per ortho team  - Bowl regimen  - Pain management  - Physical therapy eval  - Dispo planning    2. Leukocytosis - likely reactive from procedure.  Afebrile.    3. Hypothyroid - takes special supplement.     4. HLD - diet controlled    5. NAFLD - chronic, diet controlled    6. DVT prophy - per ortho team. 65 yo f pmh osteoarthritis, NAFLD, hypothyroid, HLD is s/p Right shoulder replacement POD #1.  1. s/p Right shoulder replacement POD #1  - post op orders and managemet per ortho team  - Bowl regimen  - Pain management  - Physical therapy eval  - Discharged from medical point of view.  F/U with ortho and PMD    2. Leukocytosis - likely reactive from procedure.  Afebrile.    3. Hypothyroid - takes special supplement at home.  Nothing here.  Says her levels are normal.      4. HLD - diet controlled    5. NAFLD - chronic, diet controlled    6. DVT prophy - per ortho team.

## 2019-11-07 NOTE — PROGRESS NOTE ADULT - SUBJECTIVE AND OBJECTIVE BOX
Patient is a 64y old  Female who presents with a chief complaint of Right reverse total shoulder replacement (06 Nov 2019 11:51)      INTERVAL HPI/OVERNIGHT EVENTS:    Seen at bedside.  No overnight events.     MEDICATIONS  (STANDING):  acetaminophen   Tablet .. 1000 milliGRAM(s) Oral every 8 hours  aprepitant 40 milliGRAM(s) Oral once  aspirin enteric coated 81 milliGRAM(s) Oral two times a day  celecoxib 200 milliGRAM(s) Oral every 12 hours  lactated ringers. 1000 milliLiter(s) (125 mL/Hr) IV Continuous <Continuous>  pantoprazole    Tablet 40 milliGRAM(s) Oral before breakfast    MEDICATIONS  (PRN):  aluminum hydroxide/magnesium hydroxide/simethicone Suspension 30 milliLiter(s) Oral four times a day PRN Indigestion  HYDROmorphone  Injectable 0.5 milliGRAM(s) IV Push every 3 hours PRN Severe Pain (7 - 10)  magnesium hydroxide Suspension 30 milliLiter(s) Oral daily PRN Constipation  ondansetron Injectable 4 milliGRAM(s) IV Push every 6 hours PRN Nausea and/or Vomiting  oxyCODONE    IR 5 milliGRAM(s) Oral every 3 hours PRN Mild Pain (1 - 3)  oxyCODONE    IR 10 milliGRAM(s) Oral every 3 hours PRN Moderate Pain (4 - 6)      Allergies    No Known Allergies    Intolerances        REVIEW OF SYSTEMS:  CONSTITUTIONAL: No fever, weight loss, or fatigue  EYES: No eye pain, visual disturbances, or discharge  ENMT:  No difficulty hearing, tinnitus, vertigo; No sinus or throat pain  NECK: No pain or stiffness  BREASTS: No pain, masses, or nipple discharge  RESPIRATORY: No cough, wheezing, chills or hemoptysis; No shortness of breath  CARDIOVASCULAR: No chest pain, palpitations, or lightheadedness  GASTROINTESTINAL: No abdominal or epigastric pain. No nausea, vomiting, or hematemesis; No diarrhea or constipation. No melena or hematochezia.  GENITOURINARY: No dysuria, frequency, hematuria, or incontinence  NEUROLOGICAL: No headaches, vertigo, memory loss, loss of strength, numbness, or tremors  SKIN: No itching, burning, rashes, or lesions   LYMPH NODES: No enlarged glands  ENDOCRINE: No heat or cold intolerance; No hair loss; No polydipsia or polyuria  MUSCULOSKELETAL: No back pain      Vital Signs Last 24 Hrs  T(C): 36.8 (07 Nov 2019 07:58), Max: 36.8 (07 Nov 2019 07:58)  T(F): 98.3 (07 Nov 2019 07:58), Max: 98.3 (07 Nov 2019 07:58)  HR: 58 (07 Nov 2019 07:58) (58 - 99)  BP: 144/80 (07 Nov 2019 07:58) (104/65 - 146/76)  BP(mean): --  RR: 16 (07 Nov 2019 07:58) (13 - 19)  SpO2: 97% (07 Nov 2019 07:58) (94% - 98%)    PHYSICAL EXAM:  GENERAL: NAD, well-groomed, well-developed  HEAD:  Atraumatic, Normocephalic  EYES: EOMI, PERRLA, conjunctiva and sclera clear  ENMT: Moist mucous membranes, Good dentition, No lesions; No tonsillar erythema, exudates, or enlargement   NECK: Supple, No JVD, Normal thyroid  NERVOUS SYSTEM:  Alert & Oriented X3, Good concentration; Bilateral LE mobile, sensation to light touch intact  CHEST/LUNG: Clear to auscultation bilaterally; No rales, rhonchi, wheezing, or rubs  HEART: Regular rate and rhythm; No murmurs, rubs, or gallops  ABDOMEN: Soft, Nontender, Nondistended; Bowel sounds present  EXTREMITIES:  2+ Peripheral Pulses, No clubbing or cyanosis  LYMPH: No lymphadenopathy noted  SKIN: No rashes or lesions  INCISION:  Dressing dry and intact    LABS:                        12.6   11.65 )-----------( 222      ( 07 Nov 2019 07:12 )             38.7     07 Nov 2019 07:12    141    |  107    |  9      ----------------------------<  130    4.2     |  24     |  0.69     Ca    8.8        07 Nov 2019 07:12          CAPILLARY BLOOD GLUCOSE          RADIOLOGY & ADDITIONAL TESTS:    Imaging Personally Reviewed:      [ ] Consultant(s) Notes Reviewed  [x] Care Discussed with Consultants/Other Providers:  Ortho PA- plan of care Patient is a 64y old  Female who presents with a chief complaint of Right reverse total shoulder replacement (06 Nov 2019 11:51)      INTERVAL HPI/OVERNIGHT EVENTS:    Seen at bedside.  No overnight events.  Leukocytosis, like reactive. Afebrile    MEDICATIONS  (STANDING):  acetaminophen   Tablet .. 1000 milliGRAM(s) Oral every 8 hours  aprepitant 40 milliGRAM(s) Oral once  aspirin enteric coated 81 milliGRAM(s) Oral two times a day  celecoxib 200 milliGRAM(s) Oral every 12 hours  lactated ringers. 1000 milliLiter(s) (125 mL/Hr) IV Continuous <Continuous>  pantoprazole    Tablet 40 milliGRAM(s) Oral before breakfast    MEDICATIONS  (PRN):  aluminum hydroxide/magnesium hydroxide/simethicone Suspension 30 milliLiter(s) Oral four times a day PRN Indigestion  HYDROmorphone  Injectable 0.5 milliGRAM(s) IV Push every 3 hours PRN Severe Pain (7 - 10)  magnesium hydroxide Suspension 30 milliLiter(s) Oral daily PRN Constipation  ondansetron Injectable 4 milliGRAM(s) IV Push every 6 hours PRN Nausea and/or Vomiting  oxyCODONE    IR 5 milliGRAM(s) Oral every 3 hours PRN Mild Pain (1 - 3)  oxyCODONE    IR 10 milliGRAM(s) Oral every 3 hours PRN Moderate Pain (4 - 6)      Allergies    No Known Allergies    Intolerances        REVIEW OF SYSTEMS:  CONSTITUTIONAL: No fever, weight loss, or fatigue  EYES: No eye pain, visual disturbances, or discharge  ENMT:  No difficulty hearing, tinnitus, vertigo; No sinus or throat pain  NECK: No pain or stiffness  BREASTS: No pain, masses, or nipple discharge  RESPIRATORY: No cough, wheezing, chills or hemoptysis; No shortness of breath  CARDIOVASCULAR: No chest pain, palpitations, or lightheadedness  GASTROINTESTINAL: No abdominal or epigastric pain. No nausea, vomiting, or hematemesis; No diarrhea or constipation. No melena or hematochezia.  GENITOURINARY: No dysuria, frequency, hematuria, or incontinence  NEUROLOGICAL: No headaches, vertigo, memory loss, loss of strength, numbness, or tremors  SKIN: No itching, burning, rashes, or lesions   LYMPH NODES: No enlarged glands  ENDOCRINE: No heat or cold intolerance; No hair loss; No polydipsia or polyuria  MUSCULOSKELETAL: No back pain      Vital Signs Last 24 Hrs  T(C): 36.8 (07 Nov 2019 07:58), Max: 36.8 (07 Nov 2019 07:58)  T(F): 98.3 (07 Nov 2019 07:58), Max: 98.3 (07 Nov 2019 07:58)  HR: 58 (07 Nov 2019 07:58) (58 - 99)  BP: 144/80 (07 Nov 2019 07:58) (104/65 - 146/76)  BP(mean): --  RR: 16 (07 Nov 2019 07:58) (13 - 19)  SpO2: 97% (07 Nov 2019 07:58) (94% - 98%)    PHYSICAL EXAM:  GENERAL: NAD, well-groomed, well-developed  HEAD:  Atraumatic, Normocephalic  EYES: EOMI, PERRLA, conjunctiva and sclera clear  ENMT: Moist mucous membranes, Good dentition, No lesions; No tonsillar erythema, exudates, or enlargement   NECK: Supple, No JVD, Normal thyroid  NERVOUS SYSTEM:  Alert & Oriented X3, Good concentration; Bilateral LE mobile, sensation to light touch intact  CHEST/LUNG: Clear to auscultation bilaterally; No rales, rhonchi, wheezing, or rubs  HEART: Regular rate and rhythm; No murmurs, rubs, or gallops  ABDOMEN: Soft, Nontender, Nondistended; Bowel sounds present  EXTREMITIES:  2+ Peripheral Pulses, No clubbing or cyanosis  LYMPH: No lymphadenopathy noted  SKIN: No rashes or lesions  INCISION:  Dressing dry and intact    LABS:                        12.6   11.65 )-----------( 222      ( 07 Nov 2019 07:12 )             38.7     07 Nov 2019 07:12    141    |  107    |  9      ----------------------------<  130    4.2     |  24     |  0.69     Ca    8.8        07 Nov 2019 07:12          CAPILLARY BLOOD GLUCOSE          RADIOLOGY & ADDITIONAL TESTS:    Imaging Personally Reviewed:      [ ] Consultant(s) Notes Reviewed  [x] Care Discussed with Consultants/Other Providers:  Ortho PA- plan of care Patient is a 64y old  Female who presents with a chief complaint of Right reverse total shoulder replacement (06 Nov 2019 11:51)      INTERVAL HPI/OVERNIGHT EVENTS:    Seen at bedside.  No overnight events.  Leukocytosis, like reactive. Afebrile    MEDICATIONS  (STANDING):  acetaminophen   Tablet .. 1000 milliGRAM(s) Oral every 8 hours  aprepitant 40 milliGRAM(s) Oral once  aspirin enteric coated 81 milliGRAM(s) Oral two times a day  celecoxib 200 milliGRAM(s) Oral every 12 hours  lactated ringers. 1000 milliLiter(s) (125 mL/Hr) IV Continuous <Continuous>  pantoprazole    Tablet 40 milliGRAM(s) Oral before breakfast    MEDICATIONS  (PRN):  aluminum hydroxide/magnesium hydroxide/simethicone Suspension 30 milliLiter(s) Oral four times a day PRN Indigestion  HYDROmorphone  Injectable 0.5 milliGRAM(s) IV Push every 3 hours PRN Severe Pain (7 - 10)  magnesium hydroxide Suspension 30 milliLiter(s) Oral daily PRN Constipation  ondansetron Injectable 4 milliGRAM(s) IV Push every 6 hours PRN Nausea and/or Vomiting  oxyCODONE    IR 5 milliGRAM(s) Oral every 3 hours PRN Mild Pain (1 - 3)  oxyCODONE    IR 10 milliGRAM(s) Oral every 3 hours PRN Moderate Pain (4 - 6)      Allergies    No Known Allergies    Intolerances        REVIEW OF SYSTEMS:  CONSTITUTIONAL: No fever, weight loss, or fatigue  EYES: No eye pain, visual disturbances, or discharge  ENMT:  No difficulty hearing, tinnitus, vertigo; No sinus or throat pain  NECK: No pain or stiffness  RESPIRATORY: No cough, wheezing, chills or hemoptysis; No shortness of breath  CARDIOVASCULAR: No chest pain, palpitations, or lightheadedness  GASTROINTESTINAL: No abdominal or epigastric pain. No nausea, vomiting, or hematemesis; No diarrhea or constipation. No melena or hematochezia.  GENITOURINARY: No dysuria, frequency, hematuria, or incontinence  NEUROLOGICAL: decreased sensation on right hand.  No headaches, vertigo, memory loss, loss of strength,  or tremors  SKIN: No itching, burning, rashes, or lesions   LYMPH NODES: No enlarged glands  ENDOCRINE: No heat or cold intolerance; No hair loss; No polydipsia or polyuria  MUSCULOSKELETAL: No back pain.  Right shoulder pain from procedure but tolerating it with pain medication.        Vital Signs Last 24 Hrs  T(C): 36.8 (07 Nov 2019 07:58), Max: 36.8 (07 Nov 2019 07:58)  T(F): 98.3 (07 Nov 2019 07:58), Max: 98.3 (07 Nov 2019 07:58)  HR: 58 (07 Nov 2019 07:58) (58 - 99)  BP: 144/80 (07 Nov 2019 07:58) (104/65 - 146/76)  BP(mean): --  RR: 16 (07 Nov 2019 07:58) (13 - 19)  SpO2: 97% (07 Nov 2019 07:58) (94% - 98%)    PHYSICAL EXAM:  GENERAL: NAD, well-groomed, well-developed  HEAD:  Atraumatic, Normocephalic  EYES: EOMI, PERRLA, conjunctiva and sclera clear  ENMT: No tonsillar erythema, exudates, or enlargement; Moist mucous membranes, Good dentition, No lesions  NECK: Supple, No JVD, Normal thyroid  CHEST/LUNG: Clear to percussion bilaterally; No rales, rhonchi, wheezing, or rubs  HEART: Regular rate and rhythm; No murmurs, rubs, or gallops  ABDOMEN: Soft, Nontender, Nondistended; Bowel sounds present  EXTREMITIES:  Right shoulder brace.  Able to move fingers Right hand. decreased sensation in fingers on right hand.  2+ Peripheral Pulses, No clubbing, cyanosis, or edema  LYMPH: No lymphadenopathy noted  NERVOUS SYSTEM:  Alert & Oriented X3, Good concentration; decreased sensation in fingers on right hand  SKIN: No rashes or lesions      LABS:                        12.6   11.65 )-----------( 222      ( 07 Nov 2019 07:12 )             38.7     07 Nov 2019 07:12    141    |  107    |  9      ----------------------------<  130    4.2     |  24     |  0.69     Ca    8.8        07 Nov 2019 07:12          CAPILLARY BLOOD GLUCOSE          RADIOLOGY & ADDITIONAL TESTS:    Imaging Personally Reviewed:      [ ] Consultant(s) Notes Reviewed  [x] Care Discussed with Consultants/Other Providers:  Ortho PA- plan of care

## 2019-11-07 NOTE — DISCHARGE NOTE PROVIDER - CARE PROVIDER_API CALL
Dionicio Beavers)  Orthopaedic Sports Medicine; Orthopaedic Surgery  [address] [address]  Phone: [phone]  Fax: [phone]  Follow Up Time: 2 weeks

## 2019-11-07 NOTE — OCCUPATIONAL THERAPY INITIAL EVALUATION ADULT - PRECAUTIONS/LIMITATIONS, REHAB EVAL
fall precautions/surgical precautions/NWB on operative UE. Full AAROM of shoulder. No external Rotation past 30. No resistive internal rotation. May do Codmens and pendulum

## 2019-11-07 NOTE — OCCUPATIONAL THERAPY INITIAL EVALUATION ADULT - ANTICIPATED DISCHARGE DISPOSITION, OT EVAL
home w/ level of assist/outpt when ok with MD home w/ OT/home w/ level of assist/outpt when ok with MD

## 2019-11-11 LAB — SURGICAL PATHOLOGY STUDY: SIGNIFICANT CHANGE UP

## 2019-11-12 PROBLEM — E78.5 HYPERLIPIDEMIA, UNSPECIFIED: Chronic | Status: ACTIVE | Noted: 2019-10-28

## 2019-11-12 PROBLEM — Z78.9 OTHER SPECIFIED HEALTH STATUS: Chronic | Status: ACTIVE | Noted: 2019-10-28

## 2019-11-12 PROBLEM — M19.90 UNSPECIFIED OSTEOARTHRITIS, UNSPECIFIED SITE: Chronic | Status: ACTIVE | Noted: 2019-10-28

## 2019-11-12 PROBLEM — E03.9 HYPOTHYROIDISM, UNSPECIFIED: Chronic | Status: ACTIVE | Noted: 2019-10-28

## 2019-11-12 PROBLEM — K76.0 FATTY (CHANGE OF) LIVER, NOT ELSEWHERE CLASSIFIED: Chronic | Status: ACTIVE | Noted: 2019-10-28

## 2019-11-12 PROCEDURE — 88311 DECALCIFY TISSUE: CPT

## 2019-11-12 PROCEDURE — C1713: CPT

## 2019-11-12 PROCEDURE — C1889: CPT

## 2019-11-12 PROCEDURE — 36415 COLL VENOUS BLD VENIPUNCTURE: CPT

## 2019-11-12 PROCEDURE — 88304 TISSUE EXAM BY PATHOLOGIST: CPT

## 2019-11-12 PROCEDURE — 85014 HEMATOCRIT: CPT

## 2019-11-12 PROCEDURE — 73030 X-RAY EXAM OF SHOULDER: CPT

## 2019-11-12 PROCEDURE — 80048 BASIC METABOLIC PNL TOTAL CA: CPT

## 2019-11-12 PROCEDURE — 85018 HEMOGLOBIN: CPT

## 2019-11-12 PROCEDURE — C1776: CPT

## 2019-11-12 PROCEDURE — 85027 COMPLETE CBC AUTOMATED: CPT

## 2019-11-12 PROCEDURE — 97165 OT EVAL LOW COMPLEX 30 MIN: CPT

## 2019-11-12 PROCEDURE — 97116 GAIT TRAINING THERAPY: CPT

## 2019-11-12 PROCEDURE — 97161 PT EVAL LOW COMPLEX 20 MIN: CPT

## 2019-11-12 PROCEDURE — 97530 THERAPEUTIC ACTIVITIES: CPT

## 2019-11-21 ENCOUNTER — APPOINTMENT (OUTPATIENT)
Dept: ORTHOPEDIC SURGERY | Facility: CLINIC | Age: 64
End: 2019-11-21
Payer: MEDICARE

## 2019-11-21 ENCOUNTER — TRANSCRIPTION ENCOUNTER (OUTPATIENT)
Age: 64
End: 2019-11-21

## 2019-11-21 VITALS
BODY MASS INDEX: 25.71 KG/M2 | HEIGHT: 66 IN | SYSTOLIC BLOOD PRESSURE: 119 MMHG | DIASTOLIC BLOOD PRESSURE: 75 MMHG | HEART RATE: 58 BPM | WEIGHT: 160 LBS

## 2019-11-21 PROCEDURE — 73030 X-RAY EXAM OF SHOULDER: CPT | Mod: RT

## 2019-11-21 PROCEDURE — 99024 POSTOP FOLLOW-UP VISIT: CPT

## 2019-11-21 RX ORDER — OXYCODONE 5 MG/1
5 TABLET ORAL
Qty: 20 | Refills: 0 | Status: COMPLETED | COMMUNITY
Start: 2019-11-21

## 2019-11-21 RX ORDER — OXYCODONE 5 MG/1
5 TABLET ORAL
Qty: 20 | Refills: 0 | Status: ACTIVE | COMMUNITY
Start: 2019-11-21 | End: 1900-01-01

## 2019-11-21 NOTE — ADDENDUM
[FreeTextEntry1] : I, Tonya Ayala, acted solely as a scribe for Dr. Dionicio Beavers on this date 11/21/2019.

## 2019-11-21 NOTE — HISTORY OF PRESENT ILLNESS
[___ Days Post Op] : post op day #[unfilled] [de-identified] : s/p Right reverse total shoulder arthroplasty on 11/6/2019.  [de-identified] : 64 year old female presents for a post operative evaluation s/p Right reverse total shoulder arthroplasty on 11/6/2019. She presents to the office wearing an UltraSling. The patient has been doing well post-operatively and has been experiencing minimal pain about her right shoulder. She has been taking Oxycodone to sleep at night. She has no signs of infections and denies fever, chills, nausea, or vomiting. Today the patient presents for a wound check, Prineo removal, placement of Steri-Strips and for XRays of the right shoulder.  [de-identified] : Right Upper Extremity\par o Shoulder :\par ¦ Inspection/Palpation : diffuse tenderness, no swelling, no deformities, surgical incision well healing with Prineo in place, clean, dry, and intact with no discharge or dehiscence. \par ¦ Range of Motion : PASSIVE FORWARD ELEVATION: Measured at 115 degrees, ACTIVE FORWARD ELEVATION: Measured at 70 degrees, ACTIVE EXTERNAL ROTATION: Measured at 10 degrees\par ¦ Strength : external rotation 5/5, internal rotation 5/5, supraspinatus 5/5\par ¦ Stability : no joint instability on provocative testing\par o Upper Arm : no tenderness, no swelling, no deformities\par o Muscle Bulk : no atrophy\par o Sensation : sensation intact to light touch\par o Skin : no skin rash or discoloration\par o Vascular Exam : no edema, no cyanosis, radial and ulnar pulses normal  [de-identified] : o Right Shoulder : Grashey, Axillary and Outlet views were obtained, there are no soft tissue abnormalities, no fractures, alignment is normal, normal bone density, no bony lesions, s/p Right reverse TSA with hardware in place and no signs of loosening.  [de-identified] : Prineo was removed and Steri-Strips were placed. She was instructed to allow the Steri-Strips to fall off on their own. \par \par Prescriptions were provided for Physical Therapy and Oxycodone. \par \par She can discontinue use of the sling at this time.\par \par FU 4-6 weeks.

## 2019-11-21 NOTE — END OF VISIT
[FreeTextEntry3] : All medical record entries made by the Scribe were at my, Dr. Dionicio Beavers, direction and personally dictated by me on 11/21/2019. I have reviewed the chart and agree that the record accurately reflects my personal performance of the history, physical exam, assessment and plan. I have also personally directed, reviewed, and agreed with the chart.

## 2019-11-21 NOTE — PROCEDURE
[de-identified] : o Right Shoulder : Grashey, Axillary and Outlet views were obtained, there are no soft tissue abnormalities, no fractures, alignment is normal, normal bone density, no bony lesions, s/p Right reverse TSA with hardware in place and no signs of loosening. \par \par Prineo was removed and Steri-Strips were placed. She was instructed to allow the Steri-Strips to fall off on their own. \par \par A prescription for Physical Therapy was provided. \par \par FU

## 2019-12-18 ENCOUNTER — RESULT REVIEW (OUTPATIENT)
Age: 64
End: 2019-12-18

## 2019-12-26 ENCOUNTER — APPOINTMENT (OUTPATIENT)
Dept: ORTHOPEDIC SURGERY | Facility: CLINIC | Age: 64
End: 2019-12-26
Payer: MEDICARE

## 2019-12-26 PROCEDURE — 99024 POSTOP FOLLOW-UP VISIT: CPT

## 2019-12-27 NOTE — HISTORY OF PRESENT ILLNESS
[___ Days Post Op] : post op day #[unfilled] [de-identified] : 64 year old female presents for a post operative evaluation s/p Right reverse total shoulder arthroplasty on 11/6/2019. The patient has been doing well post-operatively. Patient states she has been experiencing pain daily, that is worse at night while trying to sleep. Patient states the pain wakes her up at night and is exacerbated with activity.  She has stopped taking Oxycodone to sleep at night. She has no signs of infections and denies fever, chills, nausea, or vomiting. Today the patient presents for a follow up post operative visit.  [de-identified] : Right Upper Extremity\par o Shoulder :\par ¦ Inspection/Palpation : diffuse tenderness, no swelling, no deformities, surgical incision well healing\par ¦ Range of Motion :  ACTIVE FORWARD ELEVATION: Measured at 120 degrees, ACTIVE EXTERNAL ROTATION: Measured at 10 degrees INTERNAL ROTATION: measured at GT\par ¦ Stability : no joint instability on provocative testing\par o Upper Arm : no tenderness, no swelling, no deformities\par o Muscle Bulk : no atrophy\par o Sensation : sensation intact to light touch\par o Skin : no skin rash or discoloration\par o Vascular Exam : no edema, no cyanosis, radial and ulnar pulses normal  [de-identified] : s/p Right reverse total shoulder arthroplasty on 11/6/2019.  [de-identified] : Prescription was provided for Physical Therapy. Patient may begin progressing ROM with no limitations. \par \par FU 6 weeks.

## 2020-02-06 ENCOUNTER — APPOINTMENT (OUTPATIENT)
Dept: ORTHOPEDIC SURGERY | Facility: CLINIC | Age: 65
End: 2020-02-06
Payer: MEDICARE

## 2020-02-06 PROCEDURE — 99213 OFFICE O/P EST LOW 20 MIN: CPT

## 2020-02-07 NOTE — PHYSICAL EXAM
[Normal RUE] : Right Upper Extremity: No scars, rashes, lesions, ulcers, skin intact [Normal Touch] : sensation intact for touch [Normal] : No swelling, no edema, normal pedal pulses and normal temperature [Poor Appearance] : well-appearing [Acute Distress] : not in acute distress [de-identified] : Right Upper Extremity\par o Shoulder :\par ¦ Inspection/Palpation : no tenderness, no swelling, no deformities, surgical scar well healed \par ¦ Range of Motion : ACTIVE FORWARD ELEVATION: Measured at 150 degrees, PASSIVE EXTERNAL ROTATION: Measured at 30 degrees, ACTIVE EXTERNAL ROTATION: Measured at 20 degrees, ACTIVE INTERNAL ROTATION: Measured at PSIS \par ¦ Strength : external rotation 5-/5, internal rotation 5/5, supraspinatus 5/5\par ¦ Stability : no joint instability on provocative testing\par o Upper Arm : no tenderness, no swelling, no deformities\par o Muscle Bulk : no atrophy\par o Sensation : sensation intact to light touch\par o Skin : no skin rash or discoloration\par o Vascular Exam : no edema, no cyanosis, radial and ulnar pulses normal  [Obese] : not obese

## 2020-02-07 NOTE — END OF VISIT
[FreeTextEntry3] : All medical record entries made by the Brayanibjami were at my, Dr. Dionicio Beavers, direction and personally dictated by me on 02/06/2020. I have reviewed the chart and agree that the record accurately reflects my personal performance of the history, physical exam, assessment and plan. I have also personally directed, reviewed, and agreed with the chart.

## 2020-02-07 NOTE — HISTORY OF PRESENT ILLNESS
[de-identified] : 64 year old female presents for an evaluation of right shoulder, s/p Right reverse total shoulder arthroplasty on 11/6/2019. She has been attending physical therapy and notes improvements in strength and ROM. The patient has been doing well since her last visit and has returned to her regular daily activities. She reports minimal pain about her right shoulder, though she continues to experience moderate limitations in her right shoulder mobility compared to her left shoulder. She has been able to sleep without her pain waking her. The patient has no other complaints at this time.

## 2020-02-07 NOTE — ADDENDUM
[FreeTextEntry1] : I, Tonya Ayala, acted solely as a scribe for Dr. Dionicio Beavers on this date 02/06/2020.

## 2020-02-07 NOTE — DISCUSSION/SUMMARY
[de-identified] : The underlying pathophysiology was reviewed in great detail with the patient as well as the various treatment options, including ice, analgesics, NSAIDs, Physical therapy. \par \par She is to continue with Physical Therapy, a prescription was provided. \par \par FU 12 weeks.

## 2020-05-07 ENCOUNTER — APPOINTMENT (OUTPATIENT)
Dept: ORTHOPEDIC SURGERY | Facility: CLINIC | Age: 65
End: 2020-05-07
Payer: MEDICARE

## 2020-05-14 ENCOUNTER — APPOINTMENT (OUTPATIENT)
Dept: ORTHOPEDIC SURGERY | Facility: CLINIC | Age: 65
End: 2020-05-14
Payer: MEDICARE

## 2020-05-14 VITALS — BODY MASS INDEX: 25.71 KG/M2 | WEIGHT: 160 LBS | HEIGHT: 66 IN

## 2020-05-14 PROCEDURE — 99213 OFFICE O/P EST LOW 20 MIN: CPT

## 2020-05-14 NOTE — DISCUSSION/SUMMARY
[de-identified] : The underlying pathophysiology was reviewed in great detail with the patient as well as the various treatment options, including ice, analgesics, NSAIDs, Physical therapy. \par \par A prescription for Physical Therapy was provided. She is to continue with home exercise program to maintain strength and range of motion.\par \par Activity modifications and restrictions were discussed. I advised avoiding overhead lifting. I advised the patient to work on good posture.\par \par FU 6 months will get xrays at this time.

## 2020-05-14 NOTE — HISTORY OF PRESENT ILLNESS
[de-identified] : 64 year old female presents for an evaluation of right shoulder, s/p Right reverse total shoulder arthroplasty on 11/6/2019. She has been attending physical therapy and notes improvements in strength and ROM. The patient has been doing well since her last visit and has returned to her regular daily activities. She reports minimal pain about her right shoulder, though she continues to experience moderate limitations in her right shoulder mobility compared to her left shoulder. She reports being able to be more active with the arm including throwing a ball to her grand children with no discomfort. She continues sleep without her pain waking her. The patient has no other complaints at this time.

## 2020-05-14 NOTE — PHYSICAL EXAM
[Normal RUE] : Right Upper Extremity: No scars, rashes, lesions, ulcers, skin intact [Normal Touch] : sensation intact for touch [Normal] : No swelling, no edema, normal pedal pulses and normal temperature [Poor Appearance] : well-appearing [Acute Distress] : not in acute distress [Obese] : not obese [de-identified] : Right Upper Extremity\par o Shoulder :\par ¦ Inspection/Palpation : no tenderness, no swelling, no deformities, surgical scar well healed \par ¦ Range of Motion : ACTIVE FORWARD ELEVATION: Measured at 150 degrees, ACTIVE EXTERNAL ROTATION: Measured at 30 degrees, ACTIVE INTERNAL ROTATION: Measured at L1 \par ¦ Strength : external rotation 5/5, internal rotation 5/5, supraspinatus 5/5\par ¦ Stability : no joint instability on provocative testing\par o Upper Arm : no tenderness, no swelling, no deformities\par o Muscle Bulk : no atrophy\par o Sensation : sensation intact to light touch\par o Skin : no skin rash or discoloration\par o Vascular Exam : no edema, no cyanosis, radial and ulnar pulses normal

## 2020-10-20 ENCOUNTER — EMERGENCY (EMERGENCY)
Facility: HOSPITAL | Age: 65
LOS: 1 days | Discharge: HOME | End: 2020-10-20
Attending: EMERGENCY MEDICINE | Admitting: EMERGENCY MEDICINE
Payer: MEDICARE

## 2020-10-20 VITALS
HEIGHT: 66 IN | SYSTOLIC BLOOD PRESSURE: 138 MMHG | OXYGEN SATURATION: 96 % | TEMPERATURE: 98 F | WEIGHT: 164.91 LBS | RESPIRATION RATE: 18 BRPM | HEART RATE: 63 BPM | DIASTOLIC BLOOD PRESSURE: 78 MMHG

## 2020-10-20 VITALS
DIASTOLIC BLOOD PRESSURE: 70 MMHG | OXYGEN SATURATION: 98 % | RESPIRATION RATE: 17 BRPM | SYSTOLIC BLOOD PRESSURE: 122 MMHG | HEART RATE: 66 BPM

## 2020-10-20 DIAGNOSIS — Z98.890 OTHER SPECIFIED POSTPROCEDURAL STATES: Chronic | ICD-10-CM

## 2020-10-20 DIAGNOSIS — Z98.1 ARTHRODESIS STATUS: Chronic | ICD-10-CM

## 2020-10-20 DIAGNOSIS — R11.0 NAUSEA: ICD-10-CM

## 2020-10-20 DIAGNOSIS — Z98.891 HISTORY OF UTERINE SCAR FROM PREVIOUS SURGERY: Chronic | ICD-10-CM

## 2020-10-20 PROCEDURE — 73070 X-RAY EXAM OF ELBOW: CPT

## 2020-10-20 PROCEDURE — 99283 EMERGENCY DEPT VISIT LOW MDM: CPT

## 2020-10-20 PROCEDURE — 73070 X-RAY EXAM OF ELBOW: CPT | Mod: 26,RT

## 2020-10-20 RX ORDER — MULTIVIT-MIN/FERROUS GLUCONATE 9 MG/15 ML
1 LIQUID (ML) ORAL
Qty: 0 | Refills: 0 | DISCHARGE

## 2020-10-20 RX ORDER — ACETAMINOPHEN 500 MG
650 TABLET ORAL ONCE
Refills: 0 | Status: COMPLETED | OUTPATIENT
Start: 2020-10-20 | End: 2020-10-20

## 2020-10-20 RX ORDER — ONDANSETRON 8 MG/1
4 TABLET, FILM COATED ORAL ONCE
Refills: 0 | Status: COMPLETED | OUTPATIENT
Start: 2020-10-20 | End: 2020-10-20

## 2020-10-20 RX ADMIN — Medication 650 MILLIGRAM(S): at 18:24

## 2020-10-20 RX ADMIN — Medication 650 MILLIGRAM(S): at 16:15

## 2020-10-20 RX ADMIN — ONDANSETRON 4 MILLIGRAM(S): 8 TABLET, FILM COATED ORAL at 16:15

## 2020-10-20 NOTE — ED ADULT NURSE NOTE - OBJECTIVE STATEMENT
Patient reports right elbow pain denies any injuries. Also reports nausea and x2 vomiting episodes. Denies chest pain, shortness of breath, dizziness or headache.

## 2020-10-20 NOTE — ED PROVIDER NOTE - CLINICAL SUMMARY MEDICAL DECISION MAKING FREE TEXT BOX
Ext:   right upper ext: tenderness and minimal swelling to lateral epicondyl, from, sensation intact, soft compartments, 2+ radial pulse. no erythema or discoloration  pt 64 yo f c/o nausea w/o vomiting and burping. pt states that she was doing yoga and she injured her elbow and it started hurting this AM. she took a nap and when she woke up she had a lot of pain and swelling in her right elbow. didnt take any medications for pain  called her orthopedist and scheduled an appt to be soon to get a script for PT  denies cp, sob, weakness, numbness, tingling, fever, chills, cough  nausea and burping resolved. dec. pain and able to move elbow better. Discussed with patient need to return to ED if symptoms don't continue to improve or recur or develops any new or worsening symptoms that are of concern.

## 2020-10-20 NOTE — ED ADULT TRIAGE NOTE - CHIEF COMPLAINT QUOTE
I did yoga this morning and then started to have elbow pain. I was tired and layed down and took a nap, when I woke I belched a lot and then threw up. My doctor told me to come.

## 2020-10-20 NOTE — ED PROVIDER NOTE - ATTENDING CONTRIBUTION TO CARE
I personally evaluated the patient. I reviewed the Resident’s or Physician Assistant’s note (as assigned above), and agree with the findings and plan except as documented in my note.  Ext:   right upper ext: tenderness and minimal swelling to lateral epicondyl, from, sensation intact, soft compartments, 2+ radial pulse. no erythema or discoloration  pt 66 yo f c/o nausea w/o vomiting and burping. pt states that she was doing yoga and she injured her elbow and it started hurting this AM. she took a nap and when she woke up she had a lot of pain and swelling in her right elbow. didnt take any medications for pain  called her orthopedist and scheduled an appt to be soon to get a script for PT  denies cp, sob, weakness, numbness, tingling, fever, chills, cough  nausea and burping resolved. dec. pain and able to move elbow better. Discussed with patient need to return to ED if symptoms don't continue to improve or recur or develops any new or worsening symptoms that are of concern.

## 2020-10-20 NOTE — ED ADULT NURSE NOTE - PMH
Hyperlipidemia  no medication  Hypothyroid  Natural Iodine with L-Thyrosine  NAFLD (nonalcoholic fatty liver disease)  per patient - resolved since being on a Vegan Diet  Osteoarthritis    Vegan diet

## 2020-10-20 NOTE — ED PROVIDER NOTE - PHYSICAL EXAMINATION
General:     NAD  Eyes: PERRL  Head:     NC/AT, EOMI, oral mucosa moist  Neck:     trachea midline  Lungs:     CTA b/l  CVS:     RRR  Abd:     +BS, s/nt/nd  Ext:   right upper ext: tenderness and minimal swelling to lateral epicondyl, from, sensation intact, soft compartments, 2+ radial pulse. no erythema or discoloration  Neuro: AAOx3, no sensory/motor deficits

## 2020-10-20 NOTE — ED PROVIDER NOTE - OBJECTIVE STATEMENT
pt 64 yo f c/o nausea w/o vomiting and burping. pt states that she was doing yoga and she injured her elbow and it started hurting this AM. she took a nap and when she woke up she had a lot of pain and swelling in her right elbow. didnt take any medications for pain  called her orthopedist and scheduled an appt to be soon to get a script for PT  denies cp, sob, weakness, numbness, tingling, fever, chills, cough

## 2020-10-20 NOTE — ED PROVIDER NOTE - NSFOLLOWUPINSTRUCTIONS_ED_ALL_ED_FT
Follow up with your orthopedist with your scheduled appointment  Take naprosyn as prescribed.  Rest, ice and elevate your elbow  Any worsening of symptoms or new concerning symptoms return to the ED     Tendinitis    WHAT YOU NEED TO KNOW:    Tendinitis is painful inflammation or breakdown of your tendons. It may also be called tendinopathy. Tendinitis often occurs in the knee, shoulder, ankle, hip, or elbow.    DISCHARGE INSTRUCTIONS:    Medicines:   •Pain medicines such as acetaminophen or NSAIDs may decrease swelling and pain or fever. These medicines are available without a doctor's order. Ask which medicine to take. Ask how much to take and when to take it. Follow directions. Acetaminophen and NSAIDs can cause liver or kidney damage if not taken correctly. If you take blood thinner medicine, always ask your healthcare provider if NSAIDs are safe for you. Always read the medicine label and follow the directions on it before using these medicine.       •Take your medicine as directed. Contact your healthcare provider if you think your medicine is not helping or if you have side effects. Tell him if you are allergic to any medicine. Keep a list of the medicines, vitamins, and herbs you take. Include the amounts, and when and why you take them. Bring the list or the pill bottles to follow-up visits. Carry your medicine list with you in case of an emergency.      Management:   •Rest your tendon as directed to help it heal. Ask your healthcare provider if you need to stop putting weight on your affected area.      •Ice helps decrease swelling and pain. Ice may also help prevent tissue damage. Use an ice pack, or put crushed ice in a plastic bag. Cover it with a towel and place it on the affected area for 10 to 15 minutes every hour or as directed.      •Support devices such as a cane, splint, shoe insert, or brace may help reduce your pain.      •Physical therapy may be ordered by your healthcare provider. This may be used to teach you exercises to help improve movement and strength, and to decrease pain. You may also learn how to improve your posture, and how to lift or exercise correctly.      Prevention:   •Stretch and warm up before you exercise.       •Exercise regularly to strengthen the muscles around your joint. Ease into an exercise routine for the first 3 weeks to prevent another injury. Ask your healthcare provider about the best exercise plan for you. Rest fully between activities.      •Use the right equipment for sports and exercise. Wear braces or tape around weak joints as directed.      Follow up with your healthcare provider as directed: Write down your questions so you remember to ask them during your visits.     Contact your healthcare provider if:   •You have increased pain even after you take medicine.      •You have questions or concerns about your condition or care.      Return to the emergency department if:   •You have increased redness over the joint, or swelling in the joint.      •You suddenly cannot move your joint.

## 2020-10-22 ENCOUNTER — APPOINTMENT (OUTPATIENT)
Dept: ORTHOPEDIC SURGERY | Facility: CLINIC | Age: 65
End: 2020-10-22
Payer: MEDICARE

## 2020-10-22 VITALS — BODY MASS INDEX: 26.52 KG/M2 | WEIGHT: 165 LBS | HEIGHT: 66 IN

## 2020-10-22 PROCEDURE — 20605 DRAIN/INJ JOINT/BURSA W/O US: CPT | Mod: RT

## 2020-10-22 PROCEDURE — 99214 OFFICE O/P EST MOD 30 MIN: CPT | Mod: 25

## 2020-10-23 ENCOUNTER — NON-APPOINTMENT (OUTPATIENT)
Age: 65
End: 2020-10-23

## 2020-10-23 LAB
ALBUMIN SERPL ELPH-MCNC: 4.4 G/DL
ALP BLD-CCNC: 70 U/L
ALT SERPL-CCNC: 12 U/L
ANION GAP SERPL CALC-SCNC: 14 MMOL/L
AST SERPL-CCNC: 8 U/L
BASOPHILS # BLD AUTO: 0.03 K/UL
BASOPHILS NFR BLD AUTO: 0.2 %
BILIRUB SERPL-MCNC: 0.5 MG/DL
BUN SERPL-MCNC: 10 MG/DL
CALCIUM SERPL-MCNC: 9.6 MG/DL
CHLORIDE SERPL-SCNC: 103 MMOL/L
CO2 SERPL-SCNC: 25 MMOL/L
CREAT SERPL-MCNC: 0.5 MG/DL
CRP SERPL-MCNC: 13.28 MG/DL
EOSINOPHIL # BLD AUTO: 0.03 K/UL
EOSINOPHIL NFR BLD AUTO: 0.2 %
ERYTHROCYTE [SEDIMENTATION RATE] IN BLOOD BY WESTERGREN METHOD: 39 MM/HR
GLUCOSE SERPL-MCNC: 139 MG/DL
HCT VFR BLD CALC: 41.5 %
HGB BLD-MCNC: 13.8 G/DL
IMM GRANULOCYTES NFR BLD AUTO: 0.4 %
LYMPHOCYTES # BLD AUTO: 1.63 K/UL
LYMPHOCYTES NFR BLD AUTO: 13.5 %
MAN DIFF?: NORMAL
MCHC RBC-ENTMCNC: 32.5 PG
MCHC RBC-ENTMCNC: 33.3 GM/DL
MCV RBC AUTO: 97.9 FL
MONOCYTES # BLD AUTO: 0.73 K/UL
MONOCYTES NFR BLD AUTO: 6 %
NEUTROPHILS # BLD AUTO: 9.61 K/UL
NEUTROPHILS NFR BLD AUTO: 79.7 %
PLATELET # BLD AUTO: 239 K/UL
POTASSIUM SERPL-SCNC: 5 MMOL/L
PROT SERPL-MCNC: 6.9 G/DL
RBC # BLD: 4.24 M/UL
RBC # FLD: 12.7 %
RHEUMATOID FACT SER QL: 18 IU/ML
SODIUM SERPL-SCNC: 141 MMOL/L
URATE SERPL-MCNC: 3.7 MG/DL
WBC # FLD AUTO: 12.08 K/UL

## 2020-10-23 NOTE — PROCEDURE
[de-identified] : At this point I recommended a therapeutic injection and under sterile precautions an aspiration of the elbow joint was attempted but no fluid could be removed then an injection of 0.25 cc 1% lidocaine with 0.25 cc of Kenalog and 0.25 cc of Dexamethasone- was placed into the ulnohumeral joint of the right elbow without complication, and after several minutes, the patient felt significant relief.\par \par

## 2020-10-23 NOTE — DISCUSSION/SUMMARY
[de-identified] : The underlying pathophysiology was reviewed in great detail with the patient as well as the various treatment options, including ice, analgesics, NSAIDs, Physical therapy, steroid injections.\par \par Patient received a corticosteroid injection of the right elbow today. \par \par Activity modifications and restrictions were discussed. \par \par A prescription for lab work was provided to rule out an inflammatory disease process. \par \par Activity modifications and restrictions were discussed. I recommend that she  avoid heavy gripping/squeezing.\par \par A letter was provided for the gym. \par \par FU once results are obtained. \par \par All questions were answered, all alternatives discussed and the patient is in complete agreement with that plan. Follow-up appointment as instructed. Any issues and the patient will call or come in sooner.

## 2020-10-23 NOTE — HISTORY OF PRESENT ILLNESS
[de-identified] : YESSICA WAKEFIELD  is a 65 year old RHD  female  presenting to the office complaining of right elbow pain.  Patient reports pain for one week.  Patient denies direct injury or trauma to the area.She reports performing yard work and practicing yoga prior to the onset  of pain. She reports severe onset of elbow pain and swelling. She reports the pain radiated down her arm. She [place] ED at this time where cardiac issues were ruled out and she had xrays of the right elbow negative for acute fracture dislocation. The patient describes the pain as a dull aching, and occasionally sharp pain localized to the lateral  aspect of her right elbow that is intermittent in nature. Her  symptoms are exacerbated with any movement of the elbow, and gripping of the hand. Patient reports the pain is waking her  up at night.  Patient reports associated weakness. Denies numbness and tingling in the upper extremity.  Patient is taking NSAIDs for pain relief with mild relief in symptoms. \par Patient denies any other complaints at this time.

## 2020-10-23 NOTE — PHYSICAL EXAM
[de-identified] : Right Upper Extremity\par o Elbow :\par ¦ Inspection/Palpation : lateral ulnohumeral joint and lateral epicondyle tenderness, moderate swelling, no deformities, erythema lateral aspect of elbow, mildy warm to palpation. \par ¦ Range of Motion : limited extension and flexion and painless in all planes, no crepitus\par ¦ Strength : flexion and extension 5/5 with pain on resisted wrist extension\par ¦ Stability : no joint instability on provocative testing\par o Muscle Bulk : no atrophy\par o Sensation : sensation intact to light touch\par o Skin : no skin lesions, no discoloration\par o Vascular Exam : no edema, no cyanosis, radial and ulnar pulses normal \par \par Left Upper Extremity\par o Elbow :\par ¦ Inspection/Palpation : no tenderness, no swelling, no deformities\par ¦ Range of Motion : full and painless in all planes, no crepitus\par ¦ Strength : flexion and extension 5/5\par ¦ Stability : no joint instability on provocative testing\par o Muscle Bulk : no atrophy\par o Sensation : sensation intact to light touch\par o Skin : no skin lesions, no discoloration\par o Vascular Exam : no edema, no cyanosis, radial and ulnar pulses normal  [de-identified] : o Right elbow performed on 10/20/2020 at [facility]: Impression \par ¦  No acute finding.\par

## 2020-10-26 ENCOUNTER — NON-APPOINTMENT (OUTPATIENT)
Age: 65
End: 2020-10-26

## 2020-10-26 LAB
B BURGDOR AB SER-IMP: NEGATIVE
B BURGDOR IGM PATRN SER IB-IMP: NEGATIVE
B BURGDOR18KD IGG SER QL IB: NORMAL
B BURGDOR23KD IGG SER QL IB: NORMAL
B BURGDOR23KD IGM SER QL IB: NORMAL
B BURGDOR28KD IGG SER QL IB: NORMAL
B BURGDOR30KD IGG SER QL IB: NORMAL
B BURGDOR31KD IGG SER QL IB: NORMAL
B BURGDOR39KD IGG SER QL IB: NORMAL
B BURGDOR39KD IGM SER QL IB: NORMAL
B BURGDOR41KD IGG SER QL IB: PRESENT
B BURGDOR41KD IGM SER QL IB: NORMAL
B BURGDOR45KD IGG SER QL IB: NORMAL
B BURGDOR58KD IGG SER QL IB: NORMAL
B BURGDOR66KD IGG SER QL IB: NORMAL
B BURGDOR93KD IGG SER QL IB: NORMAL
HLA-B27 RELATED AG QL: NORMAL
SARS-COV-2 IGG SERPL IA-ACNC: <0.1 INDEX
SARS-COV-2 IGG SERPL QL IA: NEGATIVE

## 2020-11-05 ENCOUNTER — APPOINTMENT (OUTPATIENT)
Dept: RHEUMATOLOGY | Facility: CLINIC | Age: 65
End: 2020-11-05

## 2020-11-05 ENCOUNTER — APPOINTMENT (OUTPATIENT)
Dept: ORTHOPEDIC SURGERY | Facility: CLINIC | Age: 65
End: 2020-11-05
Payer: MEDICARE

## 2020-11-05 VITALS — BODY MASS INDEX: 26.03 KG/M2 | WEIGHT: 162 LBS | HEIGHT: 66 IN

## 2020-11-05 PROCEDURE — 99214 OFFICE O/P EST MOD 30 MIN: CPT

## 2020-11-05 NOTE — HISTORY OF PRESENT ILLNESS
[de-identified] : YESSICA WAKEFIELD  is a 65 year old RHD  female  presenting to the office complaining of right elbow pain that began mid October 2020  Patient denies direct injury or trauma to the area.She reports performing yard work and practicing yoga prior to the onset  of pain. She reports severe onset of elbow pain and swelling. She reports the pain radiated down her arm. She [place] ED at this time where cardiac issues were ruled out and she had xrays of the right elbow negative for acute fracture dislocation. She was prescribed an inflammatory workup at her last visit, in which she had a + ESR, + C-reactive protein and +RF. Patient reports she went to the ED and was admitted to [facility] from 10/26/2020 - 10/28/2020.   after an increase in redness of the right elbow and arm. she was diagnosed with cellulitis and was prescribed IV cefazolin and discharged home with PO Duricef. Patient completed her last day of antibiotics today. Patient states she is feeling better overall noting improvements in pain and range of motion. She plans on beginning PT next week for the elbow. \par  The patient describes the pain as a dull aching, and occasionally sharp pain localized to the lateral  aspect of her right elbow that is intermittent in nature. Her  symptoms are exacerbated with any movement of the elbow, and gripping of the hand. Patient reports the pain is waking her  up at night.  Patient reports associated weakness. Denies numbness and tingling in the upper extremity.  Patient is taking NSAIDs for pain relief with mild relief in symptoms. \par Patient denies any other complaints at this time.

## 2020-11-05 NOTE — PHYSICAL EXAM
[de-identified] : Right Upper Extremity\par o Elbow :\par ¦ Inspection/Palpation : non tender to palpation, minimal swelling, no deformities, no erythema\par ¦ Range of Motion :   ACTIVE EXTENSION: Measured at 10 degrees, ACTIVE FLEXION: Measured at 110 degrees,  ACTIVE SUPINATION: Measured at 90 degrees, ACTIVE PRONATION Measured at 90 degrees, no crepitus \par ¦ Strength : flexion and extension 5/5\par ¦ Stability : no joint instability on provocative testing\par o Muscle Bulk : no atrophy\par o Sensation : sensation intact to light touch\par o Skin : no skin lesions, no discoloration\par o Vascular Exam : no edema, no cyanosis, radial and ulnar pulses normal \par \par Left Upper Extremity\par o Elbow :\par ¦ Inspection/Palpation : no tenderness, no swelling, no deformities\par ¦ Range of Motion : full and painless in all planes, no crepitus\par ¦ Strength : flexion and extension 5/5\par ¦ Stability : no joint instability on provocative testing\par o Muscle Bulk : no atrophy\par o Sensation : sensation intact to light touch\par o Skin : no skin lesions, no discoloration\par o Vascular Exam : no edema, no cyanosis, radial and ulnar pulses normal  [de-identified] : o Right elbow performed on 10/20/2020 at [facility]: Impression \par ¦  No acute finding.\par

## 2020-11-05 NOTE — DISCUSSION/SUMMARY
[de-identified] : The underlying pathophysiology was reviewed in great detail with the patient as well as the various treatment options, including ice, analgesics, NSAIDs, Physical therapy.\par \par Continue PT for ROM\par \par Monitor for recurrence of infection now that she is off antibiotics\par \par FU 2-3 weeks\par \par All questions were answered, all alternatives discussed and the patient is in complete agreement with that plan. Follow-up appointment as instructed. Any issues and the patient will call or come in sooner.

## 2020-11-19 ENCOUNTER — APPOINTMENT (OUTPATIENT)
Dept: ORTHOPEDIC SURGERY | Facility: CLINIC | Age: 65
End: 2020-11-19
Payer: MEDICARE

## 2020-11-19 VITALS — HEIGHT: 66 IN | BODY MASS INDEX: 26.03 KG/M2 | WEIGHT: 162 LBS

## 2020-11-19 DIAGNOSIS — M75.111 INCOMPLETE ROTATOR CUFF TEAR OR RUPTURE OF RIGHT SHOULDER, NOT SPECIFIED AS TRAUMATIC: ICD-10-CM

## 2020-11-19 PROCEDURE — 73030 X-RAY EXAM OF SHOULDER: CPT | Mod: RT

## 2020-11-19 PROCEDURE — 99214 OFFICE O/P EST MOD 30 MIN: CPT

## 2020-11-19 NOTE — PHYSICAL EXAM
[de-identified] : \par Right Upper Extremity\par o Shoulder :\par ¦ Inspection/Palpation : no tenderness, no swelling, no deformities, surgical scar well healed \par ¦ Range of Motion : ACTIVE FORWARD ELEVATION: Measured at 150 degrees, ACTIVE EXTERNAL ROTATION: Measured at 30 degrees, ACTIVE INTERNAL ROTATION: Measured at L1 \par ¦ Strength : external rotation 5/5, internal rotation 5/5, supraspinatus 5/5\par ¦ Stability : no joint instability on provocative testing\par o Upper Arm : no tenderness, no swelling, no deformities\par o Muscle Bulk : no atrophy\par o Sensation : sensation intact to light touch\par o Skin : no skin rash or discoloration\par o Vascular Exam : no edema, no cyanosis, radial and ulnar pulses normal \par o Elbow :\par ¦ Inspection/Palpation : non tender to palpation, minimal swelling, no deformities, no erythema\par ¦ Range of Motion :   ACTIVE EXTENSION: Measured at 0 degrees, ACTIVE FLEXION: Measured at 130 degrees,  ACTIVE SUPINATION: Measured at 90 degrees, ACTIVE PRONATION Measured at 90 degrees, no crepitus \par ¦ Strength : flexion and extension 5/5\par ¦ Stability : no joint instability on provocative testing\par o Muscle Bulk : no atrophy\par o Sensation : sensation intact to light touch\par o Skin : no skin lesions, no discoloration\par o Vascular Exam : no edema, no cyanosis, radial and ulnar pulses normal \par \par  [de-identified] : o Right Shoulder : Grashey, Axillary and Outlet views were obtained, there are no soft tissue abnormalities, no fractures, alignment is normal, normal bone density, no bony lesions s/p reverse total shoulder arthroplasty in good positioning and proper alignment. No signs of loosening.

## 2020-11-19 NOTE — DISCUSSION/SUMMARY
[de-identified] : The underlying pathophysiology was reviewed in great detail with the patient as well as the various treatment options, including ice, analgesics, NSAIDs, Physical therapy.\par \par Continue PT for ROM\par \par Monitor for recurrence of infection now that she is off antibiotics\par \par FU 2-3 weeks\par \par All questions were answered, all alternatives discussed and the patient is in complete agreement with that plan. Follow-up appointment as instructed. Any issues and the patient will call or come in sooner.

## 2020-11-19 NOTE — HISTORY OF PRESENT ILLNESS
[de-identified] : YESSICA WAKEFIELD  is a 65 year old RHD  female  presenting to the office complaining of right elbow pain that began mid October 2020  Patient denies direct injury or trauma to the area.She reports performing yard work and practicing yoga prior to the onset  of pain. She reports severe onset of elbow pain and swelling. She reports the pain radiated down her arm. She [place] ED at this time where cardiac issues were ruled out and she had xrays of the right elbow negative for acute fracture dislocation. She was prescribed an inflammatory workup at her last visit, in which she had a + ESR, + C-reactive protein and +RF. Patient reports she went to the ED and was admitted to [facility] from 10/26/2020 - 10/28/2020.   after an increase in redness of the right elbow and arm. she was diagnosed with cellulitis and was prescribed IV cefazolin and discharged home with PO Duricef. Patient completed her last day of antibiotics today. Patient states she is feeling better overall. she went to the rheumatologist on 11/16/2020 where she was diagnosed with Psoriatic arthritis. she is not on any medication at this time. she is returning to a strict vegan diet to control her symptoms. \par The patient describes the pain as a dull aching, and occasionally sharp pain localized to the lateral  aspect of her right elbow that is intermittent in nature. Her symptoms are exacerbated with any movement of the elbow, and gripping of the hand. Patient reports the pain is no longer waking her up at night.  Patient reports associated weakness. Denies numbness and tingling in the upper extremity.  \par Patient is also s/p Right reverse total shoulder arthroplasty on 11/6/2019. She is feeling great overall. Patient is here today for her one issa evaluation of the shoulder. \par Patient denies any other complaints at this time.

## 2020-11-27 ENCOUNTER — APPOINTMENT (OUTPATIENT)
Dept: ORTHOPEDIC SURGERY | Facility: CLINIC | Age: 65
End: 2020-11-27
Payer: MEDICARE

## 2020-11-27 DIAGNOSIS — S90.122A CONTUSION OF LEFT LESSER TOE(S) W/OUT DAMAGE TO NAIL, INITIAL ENCOUNTER: ICD-10-CM

## 2020-11-27 PROCEDURE — 73630 X-RAY EXAM OF FOOT: CPT | Mod: LT

## 2020-11-27 PROCEDURE — 99214 OFFICE O/P EST MOD 30 MIN: CPT

## 2020-11-27 PROCEDURE — 73610 X-RAY EXAM OF ANKLE: CPT | Mod: RT

## 2020-11-27 NOTE — DISCUSSION/SUMMARY
[de-identified] : The underlying pathophysiology was reviewed in great detail with the patient as well as the various treatment options, including ice, analgesics, NSAIDs, Physical therapy, steroid injections, immobilization verus surgical intervention. \par \par a CAM walker was provided and fit in clinic today. Continue WBAT. \par \par A prescription was provided for a CT scan of the right foot to evaluate calcaneus fracture.\par \par Activity modifications and restrictions were discussed. \par \par FU once CT scan results are obtained. \par \par All questions were answered, all alternatives discussed and the patient is in complete agreement with that plan. Follow-up appointment as instructed. Any issues and the patient will call or come in sooner.

## 2020-11-27 NOTE — CONSULT LETTER
[Dear  ___] : Dear  [unfilled], [Consult Letter:] : I had the pleasure of evaluating your patient, [unfilled]. [Please see my note below.] : Please see my note below. [Consult Closing:] : Thank you very much for allowing me to participate in the care of this patient.  If you have any questions, please do not hesitate to contact me. [Sincerely,] : Sincerely, [FreeTextEntry3] : Dr. Dionicio Beavers \par \par

## 2020-11-27 NOTE — HISTORY OF PRESENT ILLNESS
[de-identified] : YESSICA WAKEFIELD is a 65 year  female  presenting to the office complaining of right ankle pain and left toe pain. She   presents to the office ambulating on her own, with an antalgic gait. Patient reports pain began on 11/24/2020. She reports tripping off a curb and rolling her right ankle and hitting her left toes into the ground. She went to [facility] at this time where she has xrays that were negative for acute fracture /dislocation. Patient did not bring the images with her today.  The patient describes the pain as a dull aching, and occasionally sharp pain localized to medial aspect of the ankle that is intermittent in nature. She also reports left 4th toe pain.  symptoms are exacerbated with any movement of the ankle and weightbearing.  Pain is alleviated with rest.  Patient reports instability and weakness. \par Patient is not taking anything for pain  relief at this time. \par Patient denies any other complaints at this time.

## 2020-11-27 NOTE — PHYSICAL EXAM
[de-identified] : Right Lower Extremity\par o Foot:\par ¦ Inspection/Palpation : medial hidfoot and deltoid ligament tenderness to palpation,  diffuse moderate medial aspect swelling and ecchymosis, mild lateral aspect swelling. \par ¦ Range of Motion : arc of motion limited and painful in all planes\par ¦ Stability : no joint instability on provocative testing\par ¦ Strength : not assessed today due to fracture\par o Muscle Bulk : no atrophy\par o Sensation : sensation intact to light touch\par o Skin : no skin lesions, no discoloration\par o Vascular Exam : no edema, no cyanosis, dorsalis pedis and posterior tibial pulses normal \par \par Left Lower Extremity\par o Foot:\par ¦ Inspection/Palpation : mild swelling and tenderness to palpation 4th toe, no deformity or discoloration. \par ¦ Range of Motion : arc of motion full and painless in all planes\par ¦ Stability : no joint instability on provocative testing\par ¦ Strength : all muscles 5/5\par o Muscle Bulk : no atrophy\par o Sensation : sensation intact to light touch\par o Skin : no skin lesions, no discoloration\par o Vascular Exam : no edema, no cyanosis, dorsalis pedis and posterior tibial pulses normal \par \par \par   [de-identified] : o Right Ankle : AP, lateral and oblique views were obtained, there are no soft tissue abnormalities, alignment is normal, normal appearing joint spaces, normal bone density, no bony lesions, nondisplaced fracture of the anterior process of the calcaneus. \par \par  o Left Foot: AP, lateral and oblique views of the foot were obtained, there are no soft tissue abnormalities, no fractures, alignment is normal, normal appearing joint spaces, normal bone density, no bony lesions.\par \par

## 2020-11-30 ENCOUNTER — APPOINTMENT (OUTPATIENT)
Dept: CT IMAGING | Facility: HOSPITAL | Age: 65
End: 2020-11-30
Payer: MEDICARE

## 2020-11-30 ENCOUNTER — OUTPATIENT (OUTPATIENT)
Dept: OUTPATIENT SERVICES | Facility: HOSPITAL | Age: 65
LOS: 1 days | End: 2020-11-30
Payer: MEDICARE

## 2020-11-30 DIAGNOSIS — Z98.1 ARTHRODESIS STATUS: Chronic | ICD-10-CM

## 2020-11-30 DIAGNOSIS — Z98.891 HISTORY OF UTERINE SCAR FROM PREVIOUS SURGERY: Chronic | ICD-10-CM

## 2020-11-30 DIAGNOSIS — Z98.890 OTHER SPECIFIED POSTPROCEDURAL STATES: Chronic | ICD-10-CM

## 2020-11-30 DIAGNOSIS — S92.024A NONDISPLACED FRACTURE OF ANTERIOR PROCESS OF RIGHT CALCANEUS, INITIAL ENCOUNTER FOR CLOSED FRACTURE: ICD-10-CM

## 2020-11-30 PROCEDURE — 73700 CT LOWER EXTREMITY W/O DYE: CPT | Mod: 26,RT

## 2020-11-30 PROCEDURE — 73700 CT LOWER EXTREMITY W/O DYE: CPT

## 2020-12-01 ENCOUNTER — NON-APPOINTMENT (OUTPATIENT)
Age: 65
End: 2020-12-01

## 2020-12-03 ENCOUNTER — APPOINTMENT (OUTPATIENT)
Dept: ORTHOPEDIC SURGERY | Facility: CLINIC | Age: 65
End: 2020-12-03
Payer: MEDICARE

## 2020-12-03 DIAGNOSIS — M25.521 PAIN IN RIGHT ELBOW: ICD-10-CM

## 2020-12-03 DIAGNOSIS — M25.621 STIFFNESS OF RIGHT ELBOW, NOT ELSEWHERE CLASSIFIED: ICD-10-CM

## 2020-12-03 DIAGNOSIS — L40.50 ARTHROPATHIC PSORIASIS, UNSPECIFIED: ICD-10-CM

## 2020-12-03 PROCEDURE — 73080 X-RAY EXAM OF ELBOW: CPT | Mod: RT

## 2020-12-03 PROCEDURE — 99213 OFFICE O/P EST LOW 20 MIN: CPT

## 2020-12-03 NOTE — DISCUSSION/SUMMARY
[de-identified] : The underlying pathophysiology was reviewed in great detail with the patient as well as the various treatment options, including ice, analgesics, NSAIDs, Physical therapy.\par \par Continue physical therapy as prescribed. \par \par Activity modifications and restrictions were discussed. \par \par FU prn for the elbow. FU in 2 weeks for her ankle. \par \par All questions were answered, all alternatives discussed and the patient is in complete agreement with that plan. Follow-up appointment as instructed. Any issues and the patient will call or come in sooner.

## 2020-12-03 NOTE — HISTORY OF PRESENT ILLNESS
[de-identified] : YESSICA WAKEFIELD  is a 65 year old RHD  female  presenting to the office complaining of right elbow pain that began mid October 2020  Patient denies direct injury or trauma to the area.She reports performing yard work and practicing yoga prior to the onset  of pain. She reports severe onset of elbow pain and swelling. She reports the pain radiated down her arm. She [facility] ED at this time where cardiac issues were ruled out and she had xrays of the right elbow negative for acute fracture dislocation. She was prescribed an inflammatory workup at her last visit, in which she had a + ESR, + C-reactive protein and +RF. Patient reports she went to the ED and was admitted to [facility] from 10/26/2020 - 10/28/2020.   after an increase in redness of the right elbow and arm. she was diagnosed with cellulitis and was prescribed IV cefazolin and discharged home with PO Duricef. Patient completed the course of antibiotics with no complaints. Patient states she is feeling better overall. She  has been attending physical therapy noting improvements in strength and range of motion  She went to the rheumatologist on 11/16/2020 where she was diagnosed with Psoriatic arthritis. she is not on any medication at this time. she is returning to a strict vegan diet to control her symptoms. The patient describes the pain as a dull aching, and occasionally sharp pain localized to the lateral  aspect of her right elbow that is intermittent in nature. Her symptoms are exacerbated with any movement of the elbow, and gripping of the hand. Patient reports the pain is no longer waking her up at night.  Patient reports associated weakness. Denies numbness and tingling in the upper extremity.  \par Patient is also s/p Right reverse total shoulder arthroplasty on 11/6/2019. She is feeling great overall. \par Patient denies any other complaints at this time.

## 2020-12-03 NOTE — PHYSICAL EXAM
[de-identified] : Right Upper Extremity\par o Shoulder :\par ¦ Inspection/Palpation : no tenderness, no swelling, no deformities, surgical scar well healed \par ¦ Range of Motion : ACTIVE FORWARD ELEVATION: Measured at 150 degrees, ACTIVE EXTERNAL ROTATION: Measured at 30 degrees, ACTIVE INTERNAL ROTATION: Measured at L1 \par ¦ Strength : external rotation 5/5, internal rotation 5/5, supraspinatus 5/5\par ¦ Stability : no joint instability on provocative testing\par o Upper Arm : no tenderness, no swelling, no deformities\par o Muscle Bulk : no atrophy\par o Sensation : sensation intact to light touch\par o Skin : no skin rash or discoloration\par o Vascular Exam : no edema, no cyanosis, radial and ulnar pulses normal \par o Elbow :\par ¦ Inspection/Palpation : non tender to palpation, minimal swelling, no deformities, no erythema\par ¦ Range of Motion :   ACTIVE EXTENSION: Measured at 20 degrees, ACTIVE FLEXION: Measured at 120 degrees,  ACTIVE SUPINATION: Measured at 90 degrees, ACTIVE PRONATION Measured at 90 degrees, no crepitus \par ¦ Strength : flexion and extension 5/5\par ¦ Stability : no joint instability on provocative testing\par o Muscle Bulk : no atrophy\par o Sensation : sensation intact to light touch\par o Skin : no skin lesions, no discoloration\par o Vascular Exam : no edema, no cyanosis, radial and ulnar pulses normal \par \par  [de-identified] : o Right Elbow : AP, lateral and oblique views were obtained, there are no soft tissue abnormalities, no fractures, alignment is normal, normal appearing joint spaces, normal bone density, no bony lesions.\par \par

## 2020-12-18 ENCOUNTER — APPOINTMENT (OUTPATIENT)
Dept: ORTHOPEDIC SURGERY | Facility: CLINIC | Age: 65
End: 2020-12-18
Payer: MEDICARE

## 2020-12-18 PROCEDURE — 99213 OFFICE O/P EST LOW 20 MIN: CPT

## 2020-12-18 PROCEDURE — 73610 X-RAY EXAM OF ANKLE: CPT | Mod: RT

## 2020-12-18 NOTE — DISCUSSION/SUMMARY
[de-identified] : The underlying pathophysiology was reviewed in great detail with the patient as well as the various treatment options, including ice, analgesics, NSAIDs, Physical therapy, steroid injections, immobilization verus surgical intervention. \par \par Continue WBAT may d/c CAM walker as tolerated. Discussed utilization of a supportive shoe or over the counter orthotics. \par \par Activity modifications and restrictions were discussed. \par \par A home exercise sheet was given and discussed with the patient to follow.\par \par FU 4 weeks for repeat xrays \par \par All questions were answered, all alternatives discussed and the patient is in complete agreement with that plan. Follow-up appointment as instructed. Any issues and the patient will call or come in sooner.

## 2020-12-18 NOTE — PHYSICAL EXAM
[de-identified] : Right Lower Extremity\par o Foot:\par ¦ Inspection/Palpation : medial hindfoot and deltoid ligament tenderness to palpation, mild medial aspect swelling, no ecchymosis, mild lateral ankle swelling. \par ¦ Range of Motion : arc of motion limited and painful in all planes\par ¦ Stability : no joint instability on provocative testing\par ¦ Strength : not assessed today due to fracture\par o Muscle Bulk : no atrophy\par o Sensation : sensation intact to light touch\par o Skin : no skin lesions, no discoloration\par o Vascular Exam : no edema, no cyanosis, dorsalis pedis and posterior tibial pulses normal \par \par Left Lower Extremity\par o Foot:\par ¦ Inspection/Palpation : mild swelling and tenderness to palpation 4th toe, no deformity or discoloration. \par ¦ Range of Motion : arc of motion full and painless in all planes\par ¦ Stability : no joint instability on provocative testing\par ¦ Strength : all muscles 5/5\par o Muscle Bulk : no atrophy\par o Sensation : sensation intact to light touch\par o Skin : no skin lesions, no discoloration\par o Vascular Exam : no edema, no cyanosis, dorsalis pedis and posterior tibial pulses normal  [de-identified] : o Right Ankle : AP, lateral and oblique views were obtained, there are no soft tissue abnormalities, alignment is normal, normal appearing joint spaces, normal bone density, no bony lesions, Nondisplaced vertically oriented fracture of the anterior process of the calcaneus with early callus formation, \par  Avulsion fractures of the dorsal lateral aspect of the talus and navicular with early a callus formation. \par \par o CT of the right foot performed at [facility] on 11/30/2020: Impression: \par ¦ Nondisplaced vertically oriented fracture of the anterior process of the calcaneus. There is cystic change and sclerosis along the fracture margins medially, suggestive of chronicity.\par ¦ Avulsion fractures of the dorsal lateral aspect of the talus and navicular.

## 2020-12-18 NOTE — HISTORY OF PRESENT ILLNESS
[de-identified] : YESSICA WAKEFIELD is a 65 year female presenting to the office complaining of right ankle pain and left toe pain. She presents to the office ambulating on her own immobilzied in a CAM walker. with an antalgic gait. Patient reports pain began on 11/24/2020. She reports tripping off a curb and rolling her right ankle and hitting her left toes into the ground. Patient reports decreased pain since last visit. The patient describes the pain as a dull aching, and occasionally sharp pain localized to medial aspect of the ankle that is intermittent in nature. She also reports left 4th toe pain. symptoms are exacerbated with any movement of the ankle and weightbearing. Pain is alleviated with rest. Patient reports instability and weakness. Patient is not taking anything for pain relief at this time. Patient denies any other complaints at this time.

## 2021-01-21 ENCOUNTER — APPOINTMENT (OUTPATIENT)
Dept: ORTHOPEDIC SURGERY | Facility: CLINIC | Age: 66
End: 2021-01-21

## 2021-01-29 NOTE — BRIEF OPERATIVE NOTE - CONDITION POST OP
Spoke to patient mother. 2 x's identifiers was verified. Notified the mother of results. Her voice understanding. Per mom patient is acting herself and fever has gone down. stable

## 2021-03-02 DIAGNOSIS — Z87.19 PERSONAL HISTORY OF OTHER DISEASES OF THE DIGESTIVE SYSTEM: ICD-10-CM

## 2021-03-02 DIAGNOSIS — H26.9 UNSPECIFIED CATARACT: ICD-10-CM

## 2021-03-02 DIAGNOSIS — Z87.891 PERSONAL HISTORY OF NICOTINE DEPENDENCE: ICD-10-CM

## 2021-03-02 DIAGNOSIS — Z87.09 PERSONAL HISTORY OF OTHER DISEASES OF THE RESPIRATORY SYSTEM: ICD-10-CM

## 2021-03-02 DIAGNOSIS — Z82.61 FAMILY HISTORY OF ARTHRITIS: ICD-10-CM

## 2021-03-02 DIAGNOSIS — Z78.9 OTHER SPECIFIED HEALTH STATUS: ICD-10-CM

## 2021-03-10 ENCOUNTER — APPOINTMENT (OUTPATIENT)
Dept: CARDIOLOGY | Facility: CLINIC | Age: 66
End: 2021-03-10
Payer: MEDICARE

## 2021-03-10 ENCOUNTER — NON-APPOINTMENT (OUTPATIENT)
Age: 66
End: 2021-03-10

## 2021-03-10 VITALS — BODY MASS INDEX: 26.63 KG/M2 | WEIGHT: 165 LBS

## 2021-03-10 VITALS
HEIGHT: 66 IN | OXYGEN SATURATION: 96 % | TEMPERATURE: 96.7 F | HEART RATE: 59 BPM | BODY MASS INDEX: 26.63 KG/M2 | DIASTOLIC BLOOD PRESSURE: 79 MMHG | SYSTOLIC BLOOD PRESSURE: 137 MMHG

## 2021-03-10 DIAGNOSIS — R06.02 SHORTNESS OF BREATH: ICD-10-CM

## 2021-03-10 PROCEDURE — 93000 ELECTROCARDIOGRAM COMPLETE: CPT

## 2021-03-10 PROCEDURE — 99203 OFFICE O/P NEW LOW 30 MIN: CPT

## 2021-03-10 RX ORDER — CEPHALEXIN 500 MG/1
500 CAPSULE ORAL
Qty: 16 | Refills: 3 | Status: DISCONTINUED | COMMUNITY
Start: 2020-03-02 | End: 2021-03-10

## 2021-03-11 NOTE — DISCUSSION/SUMMARY
[Stable] : stable [FreeTextEntry1] : \par Currently stable from a cardiovascular standpoint. Normotensive. Euvolemic. Exertional dyspnea likely related to underlying asthma. Continue current medications. ECG completed today and reviewed. Will obtain an echocardiogram to assess her cardiac structures and function. At this time, patient is considered an acceptable risk from a cardiac standpoint for the anticipated cataract surgery. Patient to have fasting labs completed. Pending test results, I will make further recommendations.

## 2021-03-11 NOTE — HISTORY OF PRESENT ILLNESS
[FreeTextEntry1] : Patient with psoriatic arthritis, cataract, and asthma. Conditions have been stable. Currently doing okay. Occasional shortness of breath with exertion. Denies chest pain or palpitations. Of note, she has history of asthma but has stopped using an inhaler about a year ago. Anticipating cataract surgery. Also of note, she is receiving treatment for psoriatic arthritis for which she states is not really helping her.

## 2021-03-11 NOTE — PHYSICAL EXAM
[General Appearance - Well Developed] : well developed [Normal Appearance] : normal appearance [Well Groomed] : well groomed [General Appearance - Well Nourished] : well nourished [No Deformities] : no deformities [General Appearance - In No Acute Distress] : no acute distress [Normal Conjunctiva] : the conjunctiva exhibited no abnormalities [Eyelids - No Xanthelasma] : the eyelids demonstrated no xanthelasmas [Normal Oral Mucosa] : normal oral mucosa [No Oral Pallor] : no oral pallor [No Oral Cyanosis] : no oral cyanosis [Heart Rate And Rhythm] : heart rate and rhythm were normal [Heart Sounds] : normal S1 and S2 [Murmurs] : no murmurs present [Edema] : no peripheral edema present [Respiration, Rhythm And Depth] : normal respiratory rhythm and effort [Exaggerated Use Of Accessory Muscles For Inspiration] : no accessory muscle use [Auscultation Breath Sounds / Voice Sounds] : lungs were clear to auscultation bilaterally [Abdomen Soft] : soft [Abdomen Tenderness] : non-tender [Abnormal Walk] : normal gait [Cyanosis, Localized] : no localized cyanosis [Skin Color & Pigmentation] : normal skin color and pigmentation [] : no rash [Oriented To Time, Place, And Person] : oriented to person, place, and time [No Anxiety] : not feeling anxious [FreeTextEntry1] : no carotid bruits or JVD

## 2021-03-11 NOTE — REVIEW OF SYSTEMS
[see HPI] : see HPI [Dyspnea on exertion] : dyspnea during exertion [Negative] : Psychiatric [Chest  Pressure] : no chest pressure [Chest Pain] : no chest pain [Lower Ext Edema] : no extremity edema [Leg Claudication] : no intermittent leg claudication [Palpitations] : no palpitations

## 2021-04-06 NOTE — ED PROVIDER NOTE - PATIENT PORTAL LINK FT
No
You can access the FollowMyHealth Patient Portal offered by [facility] by registering at the following website: [email or web address]. By joining [facility]’s FollowMyHealth portal, you will also be able to view your health information using other applications (apps) compatible with our system.

## 2021-04-08 ENCOUNTER — APPOINTMENT (OUTPATIENT)
Dept: CV DIAGNOSITCS | Facility: HOSPITAL | Age: 66
End: 2021-04-08
Payer: MEDICARE

## 2021-04-08 ENCOUNTER — OUTPATIENT (OUTPATIENT)
Dept: OUTPATIENT SERVICES | Facility: HOSPITAL | Age: 66
LOS: 1 days | End: 2021-04-08

## 2021-04-08 DIAGNOSIS — Z98.891 HISTORY OF UTERINE SCAR FROM PREVIOUS SURGERY: Chronic | ICD-10-CM

## 2021-04-08 DIAGNOSIS — Z98.890 OTHER SPECIFIED POSTPROCEDURAL STATES: Chronic | ICD-10-CM

## 2021-04-08 DIAGNOSIS — R06.02 SHORTNESS OF BREATH: ICD-10-CM

## 2021-04-08 DIAGNOSIS — Z98.1 ARTHRODESIS STATUS: Chronic | ICD-10-CM

## 2021-04-08 PROCEDURE — 93306 TTE W/DOPPLER COMPLETE: CPT | Mod: 26

## 2021-05-03 ENCOUNTER — LABORATORY RESULT (OUTPATIENT)
Age: 66
End: 2021-05-03

## 2021-05-03 LAB
ALBUMIN SERPL ELPH-MCNC: 4.7 G/DL
ALP BLD-CCNC: 68 U/L
ALT SERPL-CCNC: 21 U/L
ANION GAP SERPL CALC-SCNC: 12 MMOL/L
AST SERPL-CCNC: 17 U/L
BASOPHILS # BLD AUTO: 0.05 K/UL
BASOPHILS NFR BLD AUTO: 0.9 %
BILIRUB DIRECT SERPL-MCNC: 0.1 MG/DL
BILIRUB INDIRECT SERPL-MCNC: 0.3 MG/DL
BILIRUB SERPL-MCNC: 0.4 MG/DL
BUN SERPL-MCNC: 10 MG/DL
CALCIUM SERPL-MCNC: 9.7 MG/DL
CHLORIDE SERPL-SCNC: 106 MMOL/L
CHOLEST SERPL-MCNC: 255 MG/DL
CO2 SERPL-SCNC: 25 MMOL/L
CREAT SERPL-MCNC: 0.66 MG/DL
EOSINOPHIL # BLD AUTO: 0.16 K/UL
EOSINOPHIL NFR BLD AUTO: 3 %
GLUCOSE SERPL-MCNC: 133 MG/DL
HCT VFR BLD CALC: 44.4 %
HDLC SERPL-MCNC: 64 MG/DL
HGB BLD-MCNC: 14.4 G/DL
IMM GRANULOCYTES NFR BLD AUTO: 0.4 %
LDLC SERPL CALC-MCNC: 168 MG/DL
LYMPHOCYTES # BLD AUTO: 1.31 K/UL
LYMPHOCYTES NFR BLD AUTO: 24.3 %
MAN DIFF?: NORMAL
MCHC RBC-ENTMCNC: 32.4 GM/DL
MCHC RBC-ENTMCNC: 32.8 PG
MCV RBC AUTO: 101.1 FL
MONOCYTES # BLD AUTO: 0.49 K/UL
MONOCYTES NFR BLD AUTO: 9.1 %
NEUTROPHILS # BLD AUTO: 3.37 K/UL
NEUTROPHILS NFR BLD AUTO: 62.3 %
NONHDLC SERPL-MCNC: 191 MG/DL
PLATELET # BLD AUTO: 210 K/UL
POTASSIUM SERPL-SCNC: 4.8 MMOL/L
PROT SERPL-MCNC: 6.9 G/DL
RBC # BLD: 4.39 M/UL
RBC # FLD: 13 %
SODIUM SERPL-SCNC: 142 MMOL/L
TRIGL SERPL-MCNC: 115 MG/DL
WBC # FLD AUTO: 5.4 K/UL

## 2021-05-04 LAB
ESTIMATED AVERAGE GLUCOSE: 108 MG/DL
HBA1C MFR BLD HPLC: 5.4 %

## 2021-05-31 ENCOUNTER — EMERGENCY (EMERGENCY)
Facility: HOSPITAL | Age: 66
LOS: 1 days | Discharge: HOME | End: 2021-05-31
Attending: EMERGENCY MEDICINE | Admitting: EMERGENCY MEDICINE
Payer: MEDICARE

## 2021-05-31 VITALS
RESPIRATION RATE: 16 BRPM | TEMPERATURE: 98 F | DIASTOLIC BLOOD PRESSURE: 106 MMHG | OXYGEN SATURATION: 98 % | SYSTOLIC BLOOD PRESSURE: 174 MMHG | WEIGHT: 164.91 LBS | HEART RATE: 64 BPM | HEIGHT: 66 IN

## 2021-05-31 VITALS
OXYGEN SATURATION: 99 % | DIASTOLIC BLOOD PRESSURE: 95 MMHG | SYSTOLIC BLOOD PRESSURE: 162 MMHG | RESPIRATION RATE: 16 BRPM | HEART RATE: 65 BPM

## 2021-05-31 DIAGNOSIS — Z98.891 HISTORY OF UTERINE SCAR FROM PREVIOUS SURGERY: Chronic | ICD-10-CM

## 2021-05-31 DIAGNOSIS — Z98.890 OTHER SPECIFIED POSTPROCEDURAL STATES: Chronic | ICD-10-CM

## 2021-05-31 DIAGNOSIS — Z98.1 ARTHRODESIS STATUS: Chronic | ICD-10-CM

## 2021-05-31 PROCEDURE — 12001 RPR S/N/AX/GEN/TRNK 2.5CM/<: CPT

## 2021-05-31 PROCEDURE — 99283 EMERGENCY DEPT VISIT LOW MDM: CPT | Mod: 25

## 2021-05-31 NOTE — ED PROVIDER NOTE - PROGRESS NOTE DETAILS
PA: Patient is a 65 year old female with PMHx of HLD, fatty liver, hypothyroidism, who presents to ED c/o left little finger LAC when she accidentally cut it with a kitchen knife. TDAp UTD. ~Dave Díaz PA-C PA note: LAC repaired under sterile fashion, see procedure note. Patient re-examined and re-evaluated. Patient feels much better at this time. ED evaluation, Diagnosis and management discussed with the patient in detail. Workup results discussed with ED attending, OK to dc home. Close PMD follow up encouraged.  Strict ED return instructions discussed in detail and patient given the opportunity to ask any questions about their discharge diagnosis and instructions. Patient verbalized understanding. ~ Dave Díaz PA-C PA note: LAC repaired under sterile fashion, see procedure note. Offered prophylactic ABX x 3 days, patient REFUSES at this time, will keep an eye for any S&S of infection. Patient re-examined and re-evaluated. Patient feels much better at this time. ED evaluation, Diagnosis and management discussed with the patient in detail. Workup results discussed with ED attending, OK to dc home. Close PMD follow up encouraged.  Strict ED return instructions discussed in detail and patient given the opportunity to ask any questions about their discharge diagnosis and instructions. Patient verbalized understanding. ~ Dave Díaz PA-C

## 2021-05-31 NOTE — ED PROVIDER NOTE - OBJECTIVE STATEMENT
Dr. Nguyen: 65F right hand dominant, no h/o DM, tdap UTD, p/w lac to tip of left 5th finger with a knife while cutting a turkey breast. No other injuries. Denies pain.

## 2021-05-31 NOTE — ED ADULT NURSE NOTE - OBJECTIVE STATEMENT
pt came in from home for laceration to left 5th finger. pt reports she was cutting turkey breasts with a newly sharpened knife when she accidently cut her finger. Bleeding controlled. pt reports she is UTD with tetanus vaccination.

## 2021-05-31 NOTE — ED PROCEDURE NOTE - CPROC ED INFORMED CONSENT1
Patient presents for a screening Mantoux Tuberculin Skin Test   
Benefits, risks, and possible complications of procedure explained to patient/caregiver who verbalized understanding and gave verbal consent.

## 2021-05-31 NOTE — ED PROVIDER NOTE - PSH
S/P abdominoplasty    S/P arthroscopy of right shoulder  2006  S/p bilateral blepharoplasty    S/P bilateral breast reduction  1986  S/P cervical spinal fusion  2008  S/P  section  1980

## 2021-05-31 NOTE — ED PROVIDER NOTE - ATTENDING CONTRIBUTION TO CARE
Dr. Nguyen: I performed a face to face bedside interview with patient regarding history of present illness, review of symptoms and past medical history. I completed an independent physical exam.  I have discussed patient's plan of care with PA.   I agree with note as stated above, having amended the EMR as needed to reflect my findings.   This includes HISTORY OF PRESENT ILLNESS, HIV, PAST MEDICAL/SURGICAL/FAMILY/SOCIAL HISTORY, ALLERGIES AND HOME MEDICATIONS, REVIEW OF SYSTEMS, PHYSICAL EXAM, and any PROGRESS NOTES during the time I functioned as the attending physician for this patient.    Dr. Nguyen: This H&P has been written by myself in its entirety

## 2021-05-31 NOTE — ED PROCEDURE NOTE - ATTENDING CONTRIBUTION TO CARE
Dr. Nguyen: I performed a face to face bedside interview with patient regarding history of present illness, review of symptoms and past medical history. I completed an independent physical exam.  I have discussed patient's plan of care with PA.   I agree with note as stated above, having amended the EMR as needed to reflect my findings.   This includes HISTORY OF PRESENT ILLNESS, HIV, PAST MEDICAL/SURGICAL/FAMILY/SOCIAL HISTORY, ALLERGIES AND HOME MEDICATIONS, REVIEW OF SYSTEMS, PHYSICAL EXAM, and any PROGRESS NOTES during the time I functioned as the attending physician for this patient.

## 2021-05-31 NOTE — ED PROCEDURE NOTE - CPROC ED ANATOMIC LOCATION1
LEFT LITTLE FINGER: +1.5cm C-shaped superficial LAC noted tip of distal phalanx of left 5th digit. FROM. No tendon deficits. No bony deformity. Cap refill less than 2 sec.

## 2021-05-31 NOTE — ED PROVIDER NOTE - NSFOLLOWUPINSTRUCTIONS_ED_ALL_ED_FT
1. Keep wound clean and dry for a day  2. Tylenol for pain  3. Return to the ED in 7-10 days for suture removal  4. Follow up with your doctor in 1-2 days  5. Return to the ED sooner for drainage from wound, redness around wound, fevers or any concerns  *****  Laceration    WHAT YOU NEED TO KNOW:    A laceration is an injury to the skin and the soft tissue underneath it. Lacerations happen when you are cut or hit by something. They can happen anywhere on the body.     DISCHARGE INSTRUCTIONS:    Return to the emergency department if:     You have heavy bleeding or bleeding that does not stop after 10 minutes of holding firm, direct pressure over the wound.       Your wound opens up.     Contact your healthcare provider if:     You have a fever or chills.       Your laceration is red, warm, or swollen.      You have red streaks on your skin coming from your wound.      You have white or yellow drainage from the wound that smells bad.      You have pain that gets worse, even after treatment.       You have questions or concerns about your condition or care.     Medicines:     Prescription pain medicine may be given. Ask how to take this medicine safely.       Antibiotics help treat or prevent a bacterial infection.       Take your medicine as directed. Contact your healthcare provider if you think your medicine is not helping or if you have side effects. Tell him or her if you are allergic to any medicine. Keep a list of the medicines, vitamins, and herbs you take. Include the amounts, and when and why you take them. Bring the list or the pill bottles to follow-up visits. Carry your medicine list with you in case of an emergency.    Care for your wound as directed:     Do not get your wound wet until your healthcare provider says it is okay. Do not soak your wound in water. Do not go swimming until your healthcare provider says it is okay. Carefully wash the wound with soap and water. Gently pat the area dry or allow it to air dry.       Change your bandages when they get wet, dirty, or after washing. Apply new, clean bandages as directed. Do not apply elastic bandages or tape too tight. Do not put powders or lotions over your incision.       Apply antibiotic ointment as directed. Your healthcare provider may give you antibiotic ointment to put over your wound if you have stitches. If you have strips of tape over your incision, let them dry up and fall off on their own. If they do not fall off within 14 days, gently remove them. If you have glue over your wound, do not remove or pick at it. If your glue comes off, do not replace it with glue that you have at home.       Check your wound every day for signs of infection such as swelling, redness, or pus.     Self-care:     Apply ice on your wound for 15 to 20 minutes every hour or as directed. Use an ice pack, or put crushed ice in a plastic bag. Cover it with a towel. Ice helps prevent tissue damage and decreases swelling and pain.      Use a splint as directed. A splint will decrease movement and stress on your wound. It may help it heal faster. A splint may be used for lacerations over joints or areas of your body that bend. Ask your healthcare provider how to apply and remove a splint.       Decrease scarring of your wound by applying ointments as directed. Do not apply ointments until your healthcare provider says it is okay. You may need to wait until your wound is healed. Ask which ointment to buy and how often to use it. After your wound is healed, use sunscreen over the area when you are out in the sun. You should do this for at least 6 months to 1 year after your injury.     Follow up with your healthcare provider as directed: You may need to follow up in 24 to 48 hours to have your wound checked for infection. You will need to return in 3 to 14 days if you have stitches or staples so they can be removed. Care for your wound as directed to prevent infection and help it heal. Write down your questions so you remember to ask them during your visits. PLEASE HAVE YOUR SUTURES REMOVED IN 10-12 DAYS    1. Keep wound clean and dry for a day  2. Tylenol for pain  3. Return to the ED in 7-10 days for suture removal  4. Follow up with your doctor in 1-2 days  5. Return to the ED sooner for drainage from wound, redness around wound, fevers or any concerns  *****  Laceration    WHAT YOU NEED TO KNOW:    A laceration is an injury to the skin and the soft tissue underneath it. Lacerations happen when you are cut or hit by something. They can happen anywhere on the body.     DISCHARGE INSTRUCTIONS:    Return to the emergency department if:     You have heavy bleeding or bleeding that does not stop after 10 minutes of holding firm, direct pressure over the wound.       Your wound opens up.     Contact your healthcare provider if:     You have a fever or chills.       Your laceration is red, warm, or swollen.      You have red streaks on your skin coming from your wound.      You have white or yellow drainage from the wound that smells bad.      You have pain that gets worse, even after treatment.       You have questions or concerns about your condition or care.     Medicines:     Prescription pain medicine may be given. Ask how to take this medicine safely.       Antibiotics help treat or prevent a bacterial infection.       Take your medicine as directed. Contact your healthcare provider if you think your medicine is not helping or if you have side effects. Tell him or her if you are allergic to any medicine. Keep a list of the medicines, vitamins, and herbs you take. Include the amounts, and when and why you take them. Bring the list or the pill bottles to follow-up visits. Carry your medicine list with you in case of an emergency.    Care for your wound as directed:     Do not get your wound wet until your healthcare provider says it is okay. Do not soak your wound in water. Do not go swimming until your healthcare provider says it is okay. Carefully wash the wound with soap and water. Gently pat the area dry or allow it to air dry.       Change your bandages when they get wet, dirty, or after washing. Apply new, clean bandages as directed. Do not apply elastic bandages or tape too tight. Do not put powders or lotions over your incision.       Apply antibiotic ointment as directed. Your healthcare provider may give you antibiotic ointment to put over your wound if you have stitches. If you have strips of tape over your incision, let them dry up and fall off on their own. If they do not fall off within 14 days, gently remove them. If you have glue over your wound, do not remove or pick at it. If your glue comes off, do not replace it with glue that you have at home.       Check your wound every day for signs of infection such as swelling, redness, or pus.     Self-care:     Apply ice on your wound for 15 to 20 minutes every hour or as directed. Use an ice pack, or put crushed ice in a plastic bag. Cover it with a towel. Ice helps prevent tissue damage and decreases swelling and pain.      Use a splint as directed. A splint will decrease movement and stress on your wound. It may help it heal faster. A splint may be used for lacerations over joints or areas of your body that bend. Ask your healthcare provider how to apply and remove a splint.       Decrease scarring of your wound by applying ointments as directed. Do not apply ointments until your healthcare provider says it is okay. You may need to wait until your wound is healed. Ask which ointment to buy and how often to use it. After your wound is healed, use sunscreen over the area when you are out in the sun. You should do this for at least 6 months to 1 year after your injury.     Follow up with your healthcare provider as directed: You may need to follow up in 24 to 48 hours to have your wound checked for infection. You will need to return in 3 to 14 days if you have stitches or staples so they can be removed. Care for your wound as directed to prevent infection and help it heal. Write down your questions so you remember to ask them during your visits.

## 2021-05-31 NOTE — ED PROVIDER NOTE - PHYSICAL EXAMINATION
Patrick: exam below documented by me PA NOTE: GEN: AOX3, NAD. HEENT: Throat clear. Airway is patent. EYES: PERRLA. EOMI. Head: NC/AT. NECK: Supple, No JVD. FROM. C-spine non-tender. CV:S1S2, RRR, LUNGS: Non-labored breathing, no tachypnea. O2sat 100% RA. CTA b/l. No w/r/r. CHEST: Equal chest expansion and rise. No deformity. ABD: Soft, NT/ND, no rebound, no guarding. No CVAT. EXT: No e/c/c. 2+ distal pulses. LEFT LITTLE FINGER: +1.5cm C-shaped superficial LAC noted tip of distal phalanx of left 5th digit. +Minimal bleeding. No tendon deficits. FROM. No bony deformity. Cap refill less than 2sec. SKIN: No rashes. NEURO: No focal deficits. CN II-XII intact. FROM. 5/5 motor and sensory. ~BORA Renae Dr.: exam below documented by me

## 2021-05-31 NOTE — ED PROVIDER NOTE - PATIENT PORTAL LINK FT
You can access the FollowMyHealth Patient Portal offered by [facility] by registering at the following website: [email or web address]. By joining [facility]’s FollowMyHealth portal, you will also be able to view your health information using other applications (apps) compatible with our system.

## 2021-06-11 ENCOUNTER — TRANSCRIPTION ENCOUNTER (OUTPATIENT)
Age: 66
End: 2021-06-11

## 2021-07-16 ENCOUNTER — NON-APPOINTMENT (OUTPATIENT)
Age: 66
End: 2021-07-16

## 2021-07-16 ENCOUNTER — APPOINTMENT (OUTPATIENT)
Dept: RHEUMATOLOGY | Facility: CLINIC | Age: 66
End: 2021-07-16
Payer: MEDICARE

## 2021-07-16 VITALS
SYSTOLIC BLOOD PRESSURE: 129 MMHG | WEIGHT: 170 LBS | OXYGEN SATURATION: 98 % | HEIGHT: 66 IN | DIASTOLIC BLOOD PRESSURE: 81 MMHG | TEMPERATURE: 98 F | BODY MASS INDEX: 27.32 KG/M2 | HEART RATE: 53 BPM | RESPIRATION RATE: 20 BRPM

## 2021-07-16 DIAGNOSIS — L40.9 PSORIASIS, UNSPECIFIED: ICD-10-CM

## 2021-07-16 DIAGNOSIS — R70.0 ELEVATED ERYTHROCYTE SEDIMENTATION RATE: ICD-10-CM

## 2021-07-16 DIAGNOSIS — S92.024D NONDISPLACED FRACTURE OF ANTERIOR PROCESS OF RIGHT CALCANEUS, SUBSEQUENT ENCOUNTER FOR FRACTURE WITH ROUTINE HEALING: ICD-10-CM

## 2021-07-16 DIAGNOSIS — R76.8 OTHER SPECIFIED ABNORMAL IMMUNOLOGICAL FINDINGS IN SERUM: ICD-10-CM

## 2021-07-16 PROCEDURE — 99204 OFFICE O/P NEW MOD 45 MIN: CPT

## 2021-08-18 NOTE — PHYSICAL THERAPY INITIAL EVALUATION ADULT - LONG TERM MEMORY, REHAB EVAL
BASIC METABOLIC PANEL  Dx: Z01.812 Pre-procedural laboratory examination    CBC WITH DIFFERENTIAL  Dx: Z01.812 Pre-procedural laboratory examination    CBC WITH AUTOMATED DIFFERENTIAL (PERFORMABLE ONLY)  Dx: Z01.812 Pre-procedural laboratory examination    URINALYSIS & REFLEX MICROSCOPY WITH CULTURE IF INDICATED  Dx: Z01.812 Pre-procedural laboratory examination      1 GOLD  1 LAVENDER  U/A  CULTURE   intact

## 2021-08-20 PROBLEM — R76.8 ELEVATED RHEUMATOID FACTOR: Status: ACTIVE | Noted: 2021-08-20

## 2021-08-20 PROBLEM — R70.0 ELEVATED ERYTHROCYTE SEDIMENTATION RATE: Status: ACTIVE | Noted: 2021-08-20

## 2021-08-20 PROBLEM — S92.024D CLOSED NONDISPLACED FRACTURE OF ANTERIOR PROCESS OF RIGHT CALCANEUS WITH ROUTINE HEALING, SUBSEQUENT ENCOUNTER: Status: ACTIVE | Noted: 2020-11-27

## 2021-08-20 PROBLEM — L40.9 PSORIASIS: Status: ACTIVE | Noted: 2021-08-20

## 2021-08-20 RX ORDER — SULFASALAZINE 500 MG/1
500 TABLET ORAL TWICE DAILY
Refills: 0 | Status: DISCONTINUED | COMMUNITY
End: 2021-08-20

## 2021-08-20 NOTE — HISTORY OF PRESENT ILLNESS
[FreeTextEntry1] : YESSICA WAKEFIELD is a 66 year old woman referred for evaluation of + RF/ESR/CRP in setting of acute onset R elbow pain/swelling in Oct 2020, resolved with IA injection, reported hx of pinky swelling, and traumatic R ankle fx in setting of prior dx of PsA --Chronic rashes since childhood, thick rash over buttocks, dermatology thinks it might be psoriasis, never biopsied. Saw Dr Hampton a few months ago for ?PsA, started on SSZ 2 tabs BID, did not feel it helped her joints, did not have a rash while she was on it, rash has recurred since then but thinks it recurred 2/2 heat/sun exposure. Pt is not sure about dx of ?inflammatory eye, follows with Dr Laureano Fernando. \par \par Currently inflammatory arthritis ROS negative for symmetrical peripheral joint synovitis, prolonged AM stiffness, enthesitis, dactylitis, psoriasis/ rashes, eye inflammation, inflammatory low back pain, IBD. 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 Daughter with childhood spondyloarthropathy. Mother with psoriasis

## 2021-08-20 NOTE — ASSESSMENT
[FreeTextEntry1] : YESSICA WAKEFIELD is a 66 year old woman who presents with low titer RF and + ESR/CRP last year in setting of acute onset R elbow pain/swelling, prior isolate pinky swelling, and traumatic ankle fx. Hx of localized psoriatic lesions over gluteal area but no where else and some nonspecific arthralgias last year, reports no improvement with a trial of SSZ and presently without overt inflammatory arthritis sx. Rash area is too small to warrant systemic therapy. ?inflammatory eye disease per patient report but she is not sure. \par \par - discussed that at present I have low suspicion for PsA or RA given her current exam but acute monoarticular involvement of joints in the past keep PsA and crystalline arthritis on the differential, much less likely RA. \par - as rash area is localized, discussed conservative treatment such as keeping the area dry, f/u with derm if this is not effective or new areas of rash develop\par - will obtain and review notes from optho\par - RTC in 6 months to re-eval but advised sooner urgent visit if develops any acute arthritis so that I can evaluate

## 2021-08-20 NOTE — PHYSICAL EXAM
[General Appearance - Alert] : alert [General Appearance - In No Acute Distress] : in no acute distress [General Appearance - Well Nourished] : well nourished [Sclera] : the sclera and conjunctiva were normal [PERRL With Normal Accommodation] : pupils were equal in size, round, and reactive to light [Extraocular Movements] : extraocular movements were intact [Abnormal Walk] : normal gait [Nail Clubbing] : no clubbing  or cyanosis of the fingernails [Musculoskeletal - Swelling] : no joint swelling seen [Motor Tone] : muscle strength and tone were normal [Skin Color & Pigmentation] : normal skin color and pigmentation [Motor Exam] : the motor exam was normal [No Focal Deficits] : no focal deficits [Oriented To Time, Place, And Person] : oriented to person, place, and time [Impaired Insight] : insight and judgment were intact [Affect] : the affect was normal [Outer Ear] : the ears and nose were normal in appearance [Oropharynx] : the oropharynx was normal [Neck Appearance] : the appearance of the neck was normal [] : no respiratory distress [Auscultation Breath Sounds / Voice Sounds] : lungs were clear to auscultation bilaterally [Heart Rate And Rhythm] : heart rate was normal and rhythm regular [Heart Sounds] : normal S1 and S2 [Edema] : there was no peripheral edema [Bowel Sounds] : normal bowel sounds [Abdomen Soft] : soft [Abdomen Tenderness] : non-tender [No CVA Tenderness] : no ~M costovertebral angle tenderness [No Spinal Tenderness] : no spinal tenderness [FreeTextEntry1] : + psoriatic appearing rash on over gluteal area

## 2021-10-14 ENCOUNTER — APPOINTMENT (OUTPATIENT)
Dept: ORTHOPEDIC SURGERY | Facility: CLINIC | Age: 66
End: 2021-10-14
Payer: COMMERCIAL

## 2021-10-14 VITALS — WEIGHT: 165 LBS | HEIGHT: 66 IN | BODY MASS INDEX: 26.52 KG/M2

## 2021-10-14 DIAGNOSIS — M47.812 SPONDYLOSIS W/OUT MYELOPATHY OR RADICULOPATHY, CERVICAL REGION: ICD-10-CM

## 2021-10-14 DIAGNOSIS — M19.071 PRIMARY OSTEOARTHRITIS, RIGHT ANKLE AND FOOT: ICD-10-CM

## 2021-10-14 DIAGNOSIS — Z96.611 PRESENCE OF RIGHT ARTIFICIAL SHOULDER JOINT: ICD-10-CM

## 2021-10-14 DIAGNOSIS — S93.491A SPRAIN OF OTHER LIGAMENT OF RIGHT ANKLE, INITIAL ENCOUNTER: ICD-10-CM

## 2021-10-14 PROCEDURE — 73630 X-RAY EXAM OF FOOT: CPT | Mod: RT

## 2021-10-14 PROCEDURE — 73030 X-RAY EXAM OF SHOULDER: CPT | Mod: RT

## 2021-10-14 PROCEDURE — 99214 OFFICE O/P EST MOD 30 MIN: CPT

## 2021-10-14 PROCEDURE — 99072 ADDL SUPL MATRL&STAF TM PHE: CPT

## 2021-10-14 PROCEDURE — 72040 X-RAY EXAM NECK SPINE 2-3 VW: CPT

## 2021-10-14 PROCEDURE — 73610 X-RAY EXAM OF ANKLE: CPT | Mod: RT

## 2021-10-14 NOTE — PHYSICAL EXAM
[de-identified] : Cervical Spine/Neck\par Inspection/Palpation :\par ¦ Inspection : alignment midline, normal degree of lordosis present\par ¦ Skin : normal appearance, no masses, no tenderness, trachea midline\par ¦ Palpation : right > Left paraspinal and periscapular musculature tenderness to palpation\par ¦ Tests and Signs : Spurling’s (-), Lhermitte’s (-) Adolfo’s Reflex (-) \par ¦ Range of Motion : arc of motion full in all planes, no crepitus or pain with ROM\par ¦ Stability : no subluxations or other evidence of instability demonstrated during range of motion testing\par o Muscle Strength : paraspinal muscle strength within normal limits\par o Muscle Tone : paraspinal muscle tone within normal limits\par o Muscle Bulk : normal, no atrophy\par o Cervical Lymph Nodes : no lymphadenopathy present\par \par  Right Upper Extremity\par o Shoulder :\par ¦ Inspection/Palpation : no tenderness over the greater tuberosity, no acromioclavicular joint tenderness, no tenderness anterior and posterior glenohumeral joint,no swelling, no deformities, incision well healed. \par ¦ Range of Motion : ACTIVE FORWARD ELEVATION: Measured at 150 degrees, ACTIVE EXTERNAL ROTATION: Measured at 25 degrees, ACTIVE INTERNAL ROTATION: Measured at T12\par ¦ Strength : external rotation 5/5, internal rotation 5/5, supraspinatus 5/5\par ¦ Stability : no joint instability on provocative testing\par ¦ Tests/Signs : Neer (-), Gutierrez (-)\par o Upper Arm : no tenderness, no swelling, no deformities\par o Muscle Bulk : no atrophy\par o Sensation : sensation intact to light touch\par o Skin : no skin rash or discoloration\par o Vascular Exam : no edema, no cyanosis, radial and ulnar pulses normal\par \par Left Upper Extremity\par o Shoulder :\par ¦ Inspection/Palpation :  no tenderness over the greater tuberosity, no acromioclavicular joint tenderness, no tenderness anterior and posterior glenohumeral joint,no swelling, no deformities\par ¦ Range of Motion : ACTIVE FORWARD ELEVATION: Measured at 150 degrees, ACTIVE EXTERNAL ROTATION: Measured at 75 degrees, ACTIVE INTERNAL ROTATION: Measured at T8\par ¦ Strength : external rotation 5/5, internal rotation 5/5, supraspinatus 5/5\par ¦ Stability : no joint instability on provocative testing\par ¦ Tests/Signs : Neer (-), Gutierrez (-)\par o Upper Arm : no tenderness, no swelling, no deformities\par o Muscle Bulk : no atrophy\par o Sensation : sensation intact to light touch\par o Skin : no skin rash or discoloration\par o Vascular Exam : no edema, no cyanosis, radial and ulnar pulses normal \par \par Right Lower Extremity\par o Ankle :\par ¦ Inspection/Palpation : mild tenderness to palpation ATFL with mild localized swelling, no deformities\par ¦ Range of Motion : arc of motion full and painless in all planes\par ¦ Stability : no joint instability on provocative testing\par ¦ Strength : all muscles 5/5\par o Muscle Bulk : no atrophy\par o Sensation : sensation intact to light touch\par o Skin : no skin lesions, no discoloration\par o Vascular Exam : no edema, no cyanosis, posterior tibialis and dorsalis pedis pulses normal \par \par \par o Foot:\par ¦ Inspection/Palpation : mild midfoot tenderness to palpation with localized swelling\par ¦ Range of Motion : arc of motion full and painless in all planes\par ¦ Stability : no joint instability on provocative testing\par ¦ Strength : all muscles 5/5\par o Muscle Bulk : no atrophy\par o Sensation : sensation intact to light touch\par o Skin : no skin lesions, no discoloration\par o Vascular Exam : no edema, no cyanosis, dorsalis pedis and posterior tibial pulses normal \par \par  [de-identified] : o Right Shoulder : Grashey, Axillary and Outlet views were obtained, there are no soft tissue abnormalities, no fractures, alignment is normal, normal bone density, no bony lesions s/p reverse total shoulder arthroplasty in good position and proper alignment. No signs of loosening. \par \par o Cervical Spine : AP, lateral, and oblique views were obtained, there are no soft tissue abnormalities, no fractures, alignment is normal, s/p ACDF of C5/C6/C7, and degenerative changes C4/C5,  normal bone density, no bony lesions.\par \par o Right Foot AND  Ankle : AP, lateral and oblique views were obtained, there are no soft tissue abnormalities, alignment is normal, normal appearing joint spaces, normal bone density, degnerative changes talonavicular joint, \par \par \par
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
## 2021-10-14 NOTE — DISCUSSION/SUMMARY
[de-identified] : The underlying pathophysiology was reviewed in great detail with the patient as well as the various treatment options, including ice, analgesics, NSAIDs, Physical therapy, steroid injections.\par \par Activity modifications and restrictions were discussed. I advised avoiding overhead lifting. I advised the patient to work on good posture. \par \par Discussed utilization of a supportive shoe or over the counter orthotics.\par \par A home exercise sheet was given and discussed with the patient to follow. \par \par Activity modifications and restrictions were discussed. \par \par FU 6 weeks. \par \par All questions were answered, all alternatives discussed and the patient is in complete agreement with that plan. Follow-up appointment as instructed. Any issues and the patient will call or come in sooner.

## 2021-10-18 ENCOUNTER — APPOINTMENT (OUTPATIENT)
Dept: ORTHOPEDIC SURGERY | Facility: CLINIC | Age: 66
End: 2021-10-18

## 2021-10-29 ENCOUNTER — APPOINTMENT (OUTPATIENT)
Dept: CV DIAGNOSTICS | Facility: HOSPITAL | Age: 66
End: 2021-10-29
Payer: MEDICARE

## 2021-10-29 ENCOUNTER — OUTPATIENT (OUTPATIENT)
Dept: OUTPATIENT SERVICES | Facility: HOSPITAL | Age: 66
LOS: 1 days | End: 2021-10-29

## 2021-10-29 DIAGNOSIS — Z98.890 OTHER SPECIFIED POSTPROCEDURAL STATES: Chronic | ICD-10-CM

## 2021-10-29 DIAGNOSIS — Z13.6 ENCOUNTER FOR SCREENING FOR CARDIOVASCULAR DISORDERS: ICD-10-CM

## 2021-10-29 DIAGNOSIS — Z98.1 ARTHRODESIS STATUS: Chronic | ICD-10-CM

## 2021-10-29 DIAGNOSIS — Z98.891 HISTORY OF UTERINE SCAR FROM PREVIOUS SURGERY: Chronic | ICD-10-CM

## 2021-10-29 PROCEDURE — 93018 CV STRESS TEST I&R ONLY: CPT | Mod: GC

## 2021-10-29 PROCEDURE — 93016 CV STRESS TEST SUPVJ ONLY: CPT | Mod: GC

## 2021-11-04 ENCOUNTER — APPOINTMENT (OUTPATIENT)
Dept: RADIOLOGY | Facility: HOSPITAL | Age: 66
End: 2021-11-04
Payer: MEDICARE

## 2021-11-04 ENCOUNTER — OUTPATIENT (OUTPATIENT)
Dept: OUTPATIENT SERVICES | Facility: HOSPITAL | Age: 66
LOS: 1 days | End: 2021-11-04
Payer: MEDICARE

## 2021-11-04 DIAGNOSIS — Z98.1 ARTHRODESIS STATUS: Chronic | ICD-10-CM

## 2021-11-04 DIAGNOSIS — Z98.891 HISTORY OF UTERINE SCAR FROM PREVIOUS SURGERY: Chronic | ICD-10-CM

## 2021-11-04 DIAGNOSIS — Z98.890 OTHER SPECIFIED POSTPROCEDURAL STATES: Chronic | ICD-10-CM

## 2021-11-04 DIAGNOSIS — Z00.8 ENCOUNTER FOR OTHER GENERAL EXAMINATION: ICD-10-CM

## 2021-11-04 PROCEDURE — 72100 X-RAY EXAM L-S SPINE 2/3 VWS: CPT | Mod: 26

## 2021-11-04 PROCEDURE — 72070 X-RAY EXAM THORAC SPINE 2VWS: CPT | Mod: 26

## 2021-11-04 PROCEDURE — 72100 X-RAY EXAM L-S SPINE 2/3 VWS: CPT

## 2021-11-04 PROCEDURE — 72070 X-RAY EXAM THORAC SPINE 2VWS: CPT

## 2021-12-15 ENCOUNTER — APPOINTMENT (OUTPATIENT)
Dept: CARDIOLOGY | Facility: CLINIC | Age: 66
End: 2021-12-15

## 2021-12-17 ENCOUNTER — APPOINTMENT (OUTPATIENT)
Dept: RHEUMATOLOGY | Facility: CLINIC | Age: 66
End: 2021-12-17

## 2022-01-12 ENCOUNTER — NON-APPOINTMENT (OUTPATIENT)
Age: 67
End: 2022-01-12

## 2022-01-12 ENCOUNTER — LABORATORY RESULT (OUTPATIENT)
Age: 67
End: 2022-01-12

## 2022-01-12 ENCOUNTER — APPOINTMENT (OUTPATIENT)
Dept: CARDIOLOGY | Facility: CLINIC | Age: 67
End: 2022-01-12
Payer: MEDICARE

## 2022-01-12 VITALS
HEIGHT: 66 IN | BODY MASS INDEX: 26.84 KG/M2 | HEART RATE: 55 BPM | OXYGEN SATURATION: 97 % | DIASTOLIC BLOOD PRESSURE: 80 MMHG | SYSTOLIC BLOOD PRESSURE: 167 MMHG | WEIGHT: 167 LBS

## 2022-01-12 VITALS — SYSTOLIC BLOOD PRESSURE: 142 MMHG | DIASTOLIC BLOOD PRESSURE: 82 MMHG

## 2022-01-12 DIAGNOSIS — Z13.6 ENCOUNTER FOR SCREENING FOR CARDIOVASCULAR DISORDERS: ICD-10-CM

## 2022-01-12 DIAGNOSIS — L03.113 CELLULITIS OF RIGHT UPPER LIMB: ICD-10-CM

## 2022-01-12 PROCEDURE — 93000 ELECTROCARDIOGRAM COMPLETE: CPT

## 2022-01-12 PROCEDURE — 99213 OFFICE O/P EST LOW 20 MIN: CPT

## 2022-01-13 LAB
ANION GAP SERPL CALC-SCNC: 16 MMOL/L
BASOPHILS # BLD AUTO: 0.06 K/UL
BASOPHILS NFR BLD AUTO: 0.8 %
BUN SERPL-MCNC: 15 MG/DL
CALCIUM SERPL-MCNC: 10.3 MG/DL
CHLORIDE SERPL-SCNC: 103 MMOL/L
CO2 SERPL-SCNC: 23 MMOL/L
COVID-19 NUCLEOCAPSID  GAM ANTIBODY INTERPRETATION: POSITIVE
COVID-19 SPIKE DOMAIN ANTIBODY INTERPRETATION: POSITIVE
CREAT SERPL-MCNC: 0.51 MG/DL
EOSINOPHIL # BLD AUTO: 0.21 K/UL
EOSINOPHIL NFR BLD AUTO: 2.8 %
GLUCOSE SERPL-MCNC: 68 MG/DL
HCT VFR BLD CALC: 44.5 %
HGB BLD-MCNC: 14.8 G/DL
IMM GRANULOCYTES NFR BLD AUTO: 0.4 %
LYMPHOCYTES # BLD AUTO: 2.01 K/UL
LYMPHOCYTES NFR BLD AUTO: 26.6 %
MAN DIFF?: NORMAL
MCHC RBC-ENTMCNC: 33.3 GM/DL
MCHC RBC-ENTMCNC: 33.6 PG
MCV RBC AUTO: 101.1 FL
MONOCYTES # BLD AUTO: 0.73 K/UL
MONOCYTES NFR BLD AUTO: 9.6 %
NEUTROPHILS # BLD AUTO: 4.53 K/UL
NEUTROPHILS NFR BLD AUTO: 59.8 %
PLATELET # BLD AUTO: 222 K/UL
POTASSIUM SERPL-SCNC: 4.3 MMOL/L
RBC # BLD: 4.4 M/UL
RBC # FLD: 14.1 %
SARS-COV-2 AB SERPL IA-ACNC: 218 U/ML
SARS-COV-2 AB SERPL QL IA: 67.1 INDEX
SODIUM SERPL-SCNC: 142 MMOL/L
WBC # FLD AUTO: 7.57 K/UL

## 2022-01-17 PROBLEM — Z13.6 ENCOUNTER FOR SCREENING FOR CARDIOVASCULAR DISORDERS: Status: ACTIVE | Noted: 2021-03-10

## 2022-01-17 PROBLEM — L03.113 CELLULITIS OF RIGHT ELBOW: Status: RESOLVED | Noted: 2020-11-05 | Resolved: 2022-01-17

## 2022-01-17 NOTE — CARDIOLOGY SUMMARY
[de-identified] : \par 01/12/22 - sinus bradycardia, nonspecific ST abnormality\par  [de-identified] : \par 04/08/21 - MAC, normal LA, grossly normal LV systolic function, grossly normal RV systolic function, LVEF 62%

## 2022-01-17 NOTE — HISTORY OF PRESENT ILLNESS
[FreeTextEntry1] : Doing okay. Likely had COVID back in December as she became very dry (thirsty, chapped lips) and lost her sense of taste/smell. Denies chest pain, shortness of breath or palpitations. Still does not have her taste or smell. Of note, she is not vaccinated.

## 2022-01-17 NOTE — DISCUSSION/SUMMARY
[FreeTextEntry1] : \par Currently stable from a cardiovascular standpoint. Hypertensive today. Euvolemic. No ischemic or CHF symptoms. Patient with likely COVID infection in December. Continues to experience loss of taste and smell. ECG completed today and reviewed. Labs and COVID Ab checked today and reviewed. Patient states that she does not intend to get COVID vaccine and also prefers to not take any medications for blood pressure at this time.

## 2022-06-20 ENCOUNTER — APPOINTMENT (OUTPATIENT)
Dept: ORTHOPEDIC SURGERY | Facility: CLINIC | Age: 67
End: 2022-06-20
Payer: MEDICARE

## 2022-06-20 VITALS — BODY MASS INDEX: 26.52 KG/M2 | HEIGHT: 66 IN | WEIGHT: 165 LBS

## 2022-06-20 DIAGNOSIS — M25.561 PAIN IN RIGHT KNEE: ICD-10-CM

## 2022-06-20 PROCEDURE — 99214 OFFICE O/P EST MOD 30 MIN: CPT

## 2022-06-20 PROCEDURE — 73564 X-RAY EXAM KNEE 4 OR MORE: CPT | Mod: RT

## 2022-12-21 NOTE — PHYSICAL THERAPY INITIAL EVALUATION ADULT - REHAB POTENTIAL, PT EVAL
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

## 2023-03-03 ENCOUNTER — OUTPATIENT (OUTPATIENT)
Dept: OUTPATIENT SERVICES | Facility: HOSPITAL | Age: 68
LOS: 1 days | End: 2023-03-03
Payer: MEDICARE

## 2023-03-03 ENCOUNTER — APPOINTMENT (OUTPATIENT)
Dept: ULTRASOUND IMAGING | Facility: HOSPITAL | Age: 68
End: 2023-03-03

## 2023-03-03 DIAGNOSIS — Z98.1 ARTHRODESIS STATUS: Chronic | ICD-10-CM

## 2023-03-03 DIAGNOSIS — Z98.890 OTHER SPECIFIED POSTPROCEDURAL STATES: Chronic | ICD-10-CM

## 2023-03-03 DIAGNOSIS — Z00.8 ENCOUNTER FOR OTHER GENERAL EXAMINATION: ICD-10-CM

## 2023-03-03 DIAGNOSIS — Z98.891 HISTORY OF UTERINE SCAR FROM PREVIOUS SURGERY: Chronic | ICD-10-CM

## 2023-03-03 PROCEDURE — 93971 EXTREMITY STUDY: CPT | Mod: 26,RT

## 2023-03-03 PROCEDURE — 93971 EXTREMITY STUDY: CPT

## 2023-08-14 NOTE — ASU PREOP CHECKLIST -  HIBICLENS SHOWER 2 DATE
Pt presents to ED via EMS from MD office with c/o L sided arm pain x 2 days worse on movement. Pt was seen by PMD today and had abnormal EKG who then advised pt to come to ED for further eval.
05-Nov-2019 06:39

## 2023-08-18 ENCOUNTER — APPOINTMENT (OUTPATIENT)
Dept: RADIOLOGY | Facility: HOSPITAL | Age: 68
End: 2023-08-18

## 2023-08-18 ENCOUNTER — OUTPATIENT (OUTPATIENT)
Dept: OUTPATIENT SERVICES | Facility: HOSPITAL | Age: 68
LOS: 1 days | End: 2023-08-18
Payer: MEDICARE

## 2023-08-18 DIAGNOSIS — Z98.891 HISTORY OF UTERINE SCAR FROM PREVIOUS SURGERY: Chronic | ICD-10-CM

## 2023-08-18 DIAGNOSIS — Z98.890 OTHER SPECIFIED POSTPROCEDURAL STATES: Chronic | ICD-10-CM

## 2023-08-18 DIAGNOSIS — Z00.8 ENCOUNTER FOR OTHER GENERAL EXAMINATION: ICD-10-CM

## 2023-08-18 DIAGNOSIS — Z98.1 ARTHRODESIS STATUS: Chronic | ICD-10-CM

## 2023-08-18 PROCEDURE — 73502 X-RAY EXAM HIP UNI 2-3 VIEWS: CPT | Mod: 26,RT

## 2023-08-18 PROCEDURE — 73502 X-RAY EXAM HIP UNI 2-3 VIEWS: CPT

## 2023-09-08 ENCOUNTER — APPOINTMENT (OUTPATIENT)
Dept: MRI IMAGING | Facility: HOSPITAL | Age: 68
End: 2023-09-08

## 2023-09-21 NOTE — OCCUPATIONAL THERAPY INITIAL EVALUATION ADULT - NS ASR FOLLOW COMMAND OT EVAL
Benadryl for itching  Calamine lotion topically  Tylenol and or Motrin for pain 100% of the time/Patient educated regarding fall prevention and home/hospital safety. Pt educated on use of AE/DME recommended for safety & the need to call for assistance. Patient with good understanding. Role of OT and patient goals discussed. Patient educated regarding diagnosis specific precautions and provided with educational folder including goals, expectations and ther ex. Pt educated on role and goal of OT. Discharge planning and recommendations reviewed with the patient. Case management/ Social work notified .

## 2024-05-06 DIAGNOSIS — Z13.1 ENCOUNTER FOR SCREENING FOR DIABETES MELLITUS: ICD-10-CM

## 2024-05-23 ENCOUNTER — NON-APPOINTMENT (OUTPATIENT)
Age: 69
End: 2024-05-23

## 2024-05-23 ENCOUNTER — APPOINTMENT (OUTPATIENT)
Dept: CARDIOLOGY | Facility: CLINIC | Age: 69
End: 2024-05-23
Payer: MEDICARE

## 2024-05-23 VITALS
BODY MASS INDEX: 26.03 KG/M2 | DIASTOLIC BLOOD PRESSURE: 89 MMHG | SYSTOLIC BLOOD PRESSURE: 150 MMHG | HEIGHT: 66 IN | OXYGEN SATURATION: 100 % | HEART RATE: 44 BPM | WEIGHT: 162 LBS | TEMPERATURE: 98 F

## 2024-05-23 DIAGNOSIS — Z20.822 CONTACT WITH AND (SUSPECTED) EXPOSURE TO COVID-19: ICD-10-CM

## 2024-05-23 DIAGNOSIS — G47.00 INSOMNIA, UNSPECIFIED: ICD-10-CM

## 2024-05-23 DIAGNOSIS — R03.0 ELEVATED BLOOD-PRESSURE READING, W/OUT DIAGNOSIS OF HYPERTENSION: ICD-10-CM

## 2024-05-23 PROCEDURE — 99213 OFFICE O/P EST LOW 20 MIN: CPT

## 2024-05-23 PROCEDURE — 93000 ELECTROCARDIOGRAM COMPLETE: CPT

## 2024-05-23 RX ORDER — ZOLPIDEM TARTRATE 5 MG/1
5 TABLET ORAL
Qty: 90 | Refills: 0 | Status: ACTIVE | COMMUNITY
Start: 2024-05-23 | End: 1900-01-01

## 2024-05-24 PROBLEM — G47.00 INSOMNIA: Status: ACTIVE | Noted: 2024-05-23

## 2024-05-24 NOTE — CARDIOLOGY SUMMARY
[de-identified] : 05/23/24 - marked sinus bradycardia, 44 bpm, nonspecific ST abnormality [de-identified] : 04/08/21 - MAC, normal LA, grossly normal LV systolic function, grossly normal RV systolic function, LVEF 62%

## 2024-05-24 NOTE — HISTORY OF PRESENT ILLNESS
[FreeTextEntry1] : Currently doing well. Denies chest pain, shortness of breath or palpitations. Does not check BP at home. Has issues sleeping.

## 2024-05-24 NOTE — DISCUSSION/SUMMARY
[EKG obtained to assist in diagnosis and management of assessed problem(s)] : EKG obtained to assist in diagnosis and management of assessed problem(s) [FreeTextEntry1] : Currently stable from a cardiovascular standpoint. Hypertensive today. Euvolemic. No ischemic or CHF symptoms. Has insomnia. ECG completed today and reviewed. Advised patient to monitor BP readings at home. Should BP remain elevated, will prescribe antihypertensive. Prescription for zolpidem 5 mg nightly prn given for insomnia. Instructed patient to follow up with PCP for future management of insomnia.

## 2025-01-24 NOTE — H&P PST ADULT - NSICDXPASTSURGICALHX_GEN_ALL_CORE_FT
Attended joint education session on 12/17/2024 via in person method with opportunity to ask questions. Contact information for follow up questions given. PAST SURGICAL HISTORY:  S/P abdominoplasty     S/P arthroscopy of right shoulder 2006    S/p bilateral blepharoplasty 2004    S/P bilateral breast reduction 1986    S/P cervical spinal fusion     S/P  section 1980

## 2025-05-21 ENCOUNTER — APPOINTMENT (OUTPATIENT)
Dept: ORTHOPEDIC SURGERY | Facility: CLINIC | Age: 70
End: 2025-05-21
Payer: MEDICARE

## 2025-05-21 VITALS — WEIGHT: 158 LBS | HEIGHT: 66 IN | BODY MASS INDEX: 25.39 KG/M2

## 2025-05-21 DIAGNOSIS — M47.816 SPONDYLOSIS W/OUT MYELOPATHY OR RADICULOPATHY, LUMBAR REGION: ICD-10-CM

## 2025-05-21 DIAGNOSIS — M16.0 BILATERAL PRIMARY OSTEOARTHRITIS OF HIP: ICD-10-CM

## 2025-05-21 PROCEDURE — 99214 OFFICE O/P EST MOD 30 MIN: CPT

## 2025-05-21 PROCEDURE — 73502 X-RAY EXAM HIP UNI 2-3 VIEWS: CPT

## 2025-05-21 PROCEDURE — 72100 X-RAY EXAM L-S SPINE 2/3 VWS: CPT

## 2025-05-21 PROCEDURE — 72170 X-RAY EXAM OF PELVIS: CPT | Mod: RT,59

## 2025-06-25 ENCOUNTER — APPOINTMENT (OUTPATIENT)
Dept: ORTHOPEDIC SURGERY | Facility: CLINIC | Age: 70
End: 2025-06-25